# Patient Record
Sex: MALE | Race: WHITE | NOT HISPANIC OR LATINO | Employment: UNEMPLOYED | URBAN - METROPOLITAN AREA
[De-identification: names, ages, dates, MRNs, and addresses within clinical notes are randomized per-mention and may not be internally consistent; named-entity substitution may affect disease eponyms.]

---

## 2018-12-05 ENCOUNTER — DOCUMENTATION (OUTPATIENT)
Dept: OTHER | Facility: HOSPITAL | Age: 63
End: 2018-12-05

## 2018-12-05 ENCOUNTER — TELEPHONE (OUTPATIENT)
Dept: GENETICS | Facility: HOSPITAL | Age: 63
End: 2018-12-05

## 2018-12-06 ENCOUNTER — TELEPHONE (OUTPATIENT)
Dept: GENETICS | Facility: HOSPITAL | Age: 63
End: 2018-12-06

## 2018-12-12 ENCOUNTER — TRANSCRIBE ORDERS (OUTPATIENT)
Dept: ADMINISTRATIVE | Facility: HOSPITAL | Age: 63
End: 2018-12-12

## 2018-12-12 DIAGNOSIS — C43.62 MALIGNANT MELANOMA OF LEFT UPPER EXTREMITY INCLUDING SHOULDER (HCC): Primary | ICD-10-CM

## 2018-12-17 ENCOUNTER — HOSPITAL ENCOUNTER (OUTPATIENT)
Dept: NUCLEAR MEDICINE | Facility: HOSPITAL | Age: 63
Discharge: HOME | End: 2018-12-17
Attending: SURGERY

## 2018-12-17 ENCOUNTER — ANESTHESIA (OUTPATIENT)
Dept: PERIOP | Facility: HOSPITAL | Age: 63
End: 2018-12-17

## 2018-12-17 ENCOUNTER — ANESTHESIA EVENT (OUTPATIENT)
Dept: PERIOP | Facility: HOSPITAL | Age: 63
End: 2018-12-17

## 2018-12-17 ENCOUNTER — HOSPITAL ENCOUNTER (OUTPATIENT)
Facility: HOSPITAL | Age: 63
Setting detail: HOSPITAL OUTPATIENT SURGERY
Discharge: HOME | End: 2018-12-17
Attending: SURGERY | Admitting: SURGERY

## 2018-12-17 VITALS
DIASTOLIC BLOOD PRESSURE: 91 MMHG | OXYGEN SATURATION: 96 % | RESPIRATION RATE: 16 BRPM | WEIGHT: 243 LBS | SYSTOLIC BLOOD PRESSURE: 150 MMHG | TEMPERATURE: 98.4 F | HEIGHT: 74 IN | BODY MASS INDEX: 31.18 KG/M2 | HEART RATE: 72 BPM

## 2018-12-17 DIAGNOSIS — C43.62 MALIGNANT MELANOMA OF LEFT UPPER EXTREMITY INCLUDING SHOULDER (HCC): ICD-10-CM

## 2018-12-17 DIAGNOSIS — C43.62 MALIGNANT MELANOMA OF LEFT UPPER ARM (HCC): ICD-10-CM

## 2018-12-17 LAB
ANION GAP SERPL CALCULATED.3IONS-SCNC: 6 MMOL/L (ref 3–11)
BUN BLD-MCNC: 18 MG/DL (ref 9–23)
BUN/CREAT SERPL: 20.9 (ref 7–25)
CALCIUM SPEC-SCNC: 9.4 MG/DL (ref 8.7–10.4)
CHLORIDE SERPL-SCNC: 108 MMOL/L (ref 99–109)
CO2 SERPL-SCNC: 27 MMOL/L (ref 20–31)
CREAT BLD-MCNC: 0.86 MG/DL (ref 0.6–1.3)
DEPRECATED RDW RBC AUTO: 43.8 FL (ref 37–54)
ERYTHROCYTE [DISTWIDTH] IN BLOOD BY AUTOMATED COUNT: 13 % (ref 11.3–14.5)
GFR SERPL CREATININE-BSD FRML MDRD: 90 ML/MIN/1.73
GLUCOSE BLD-MCNC: 97 MG/DL (ref 70–100)
HCT VFR BLD AUTO: 44.6 % (ref 38.9–50.9)
HGB BLD-MCNC: 15.1 G/DL (ref 13.1–17.5)
MCH RBC QN AUTO: 31.5 PG (ref 27–31)
MCHC RBC AUTO-ENTMCNC: 33.9 G/DL (ref 32–36)
MCV RBC AUTO: 92.9 FL (ref 80–99)
PLATELET # BLD AUTO: 217 10*3/MM3 (ref 150–450)
PMV BLD AUTO: 9.9 FL (ref 6–12)
POTASSIUM BLD-SCNC: 3.8 MMOL/L (ref 3.5–5.5)
RBC # BLD AUTO: 4.8 10*6/MM3 (ref 4.2–5.76)
SODIUM BLD-SCNC: 141 MMOL/L (ref 132–146)
WBC NRBC COR # BLD: 6.5 10*3/MM3 (ref 3.5–10.8)

## 2018-12-17 PROCEDURE — 25010000003 CEFAZOLIN IN DEXTROSE 2-4 GM/100ML-% SOLUTION: Performed by: SURGERY

## 2018-12-17 PROCEDURE — 93005 ELECTROCARDIOGRAM TRACING: CPT | Performed by: SURGERY

## 2018-12-17 PROCEDURE — 88342 IMHCHEM/IMCYTCHM 1ST ANTB: CPT | Performed by: SURGERY

## 2018-12-17 PROCEDURE — 93010 ELECTROCARDIOGRAM REPORT: CPT | Performed by: INTERNAL MEDICINE

## 2018-12-17 PROCEDURE — 88341 IMHCHEM/IMCYTCHM EA ADD ANTB: CPT | Performed by: SURGERY

## 2018-12-17 PROCEDURE — A9541 TC99M SULFUR COLLOID: HCPCS | Performed by: SURGERY

## 2018-12-17 PROCEDURE — 25010000002 FENTANYL CITRATE (PF) 100 MCG/2ML SOLUTION: Performed by: NURSE ANESTHETIST, CERTIFIED REGISTERED

## 2018-12-17 PROCEDURE — 88305 TISSUE EXAM BY PATHOLOGIST: CPT | Performed by: SURGERY

## 2018-12-17 PROCEDURE — 25010000002 ONDANSETRON PER 1 MG: Performed by: NURSE ANESTHETIST, CERTIFIED REGISTERED

## 2018-12-17 PROCEDURE — 80048 BASIC METABOLIC PNL TOTAL CA: CPT | Performed by: SURGERY

## 2018-12-17 PROCEDURE — 88307 TISSUE EXAM BY PATHOLOGIST: CPT | Performed by: SURGERY

## 2018-12-17 PROCEDURE — 85027 COMPLETE CBC AUTOMATED: CPT | Performed by: SURGERY

## 2018-12-17 PROCEDURE — 25010000002 PROPOFOL 10 MG/ML EMULSION: Performed by: NURSE ANESTHETIST, CERTIFIED REGISTERED

## 2018-12-17 PROCEDURE — 0 TECHNETIUM FILTERED SULFUR COLLOID: Performed by: SURGERY

## 2018-12-17 PROCEDURE — 25010000002 MIDAZOLAM PER 1 MG: Performed by: ANESTHESIOLOGY

## 2018-12-17 PROCEDURE — 78195 LYMPH SYSTEM IMAGING: CPT

## 2018-12-17 PROCEDURE — 25010000002 DEXAMETHASONE PER 1 MG: Performed by: NURSE ANESTHETIST, CERTIFIED REGISTERED

## 2018-12-17 RX ORDER — SODIUM CHLORIDE, SODIUM LACTATE, POTASSIUM CHLORIDE, CALCIUM CHLORIDE 600; 310; 30; 20 MG/100ML; MG/100ML; MG/100ML; MG/100ML
9 INJECTION, SOLUTION INTRAVENOUS CONTINUOUS PRN
Status: DISCONTINUED | OUTPATIENT
Start: 2018-12-17 | End: 2018-12-17 | Stop reason: HOSPADM

## 2018-12-17 RX ORDER — MEPERIDINE HYDROCHLORIDE 25 MG/ML
12.5 INJECTION INTRAMUSCULAR; INTRAVENOUS; SUBCUTANEOUS
Status: DISCONTINUED | OUTPATIENT
Start: 2018-12-17 | End: 2018-12-17 | Stop reason: HOSPADM

## 2018-12-17 RX ORDER — ONDANSETRON 2 MG/ML
4 INJECTION INTRAMUSCULAR; INTRAVENOUS ONCE AS NEEDED
Status: DISCONTINUED | OUTPATIENT
Start: 2018-12-17 | End: 2018-12-17 | Stop reason: HOSPADM

## 2018-12-17 RX ORDER — ATENOLOL AND CHLORTHALIDONE TABLET 50; 25 MG/1; MG/1
1 TABLET ORAL DAILY
COMMUNITY

## 2018-12-17 RX ORDER — FENTANYL CITRATE 50 UG/ML
INJECTION, SOLUTION INTRAMUSCULAR; INTRAVENOUS AS NEEDED
Status: DISCONTINUED | OUTPATIENT
Start: 2018-12-17 | End: 2018-12-17 | Stop reason: SURG

## 2018-12-17 RX ORDER — MULTIVIT AND MINERALS-FERROUS GLUCONATE 9 MG IRON/15 ML ORAL LIQUID 9 MG/15 ML
LIQUID (ML) ORAL DAILY
COMMUNITY

## 2018-12-17 RX ORDER — DOCUSATE SODIUM 100 MG/1
100 CAPSULE, LIQUID FILLED ORAL ONCE
Status: DISCONTINUED | OUTPATIENT
Start: 2018-12-17 | End: 2018-12-17 | Stop reason: HOSPADM

## 2018-12-17 RX ORDER — ATORVASTATIN CALCIUM 40 MG/1
40 TABLET, FILM COATED ORAL NIGHTLY
COMMUNITY

## 2018-12-17 RX ORDER — PROPOFOL 10 MG/ML
VIAL (ML) INTRAVENOUS AS NEEDED
Status: DISCONTINUED | OUTPATIENT
Start: 2018-12-17 | End: 2018-12-17 | Stop reason: SURG

## 2018-12-17 RX ORDER — TAMSULOSIN HYDROCHLORIDE 0.4 MG/1
1 CAPSULE ORAL NIGHTLY
COMMUNITY

## 2018-12-17 RX ORDER — HYDROCODONE BITARTRATE AND ACETAMINOPHEN 5; 325 MG/1; MG/1
1 TABLET ORAL ONCE AS NEEDED
Status: DISCONTINUED | OUTPATIENT
Start: 2018-12-17 | End: 2018-12-17 | Stop reason: HOSPADM

## 2018-12-17 RX ORDER — ONDANSETRON 2 MG/ML
INJECTION INTRAMUSCULAR; INTRAVENOUS AS NEEDED
Status: DISCONTINUED | OUTPATIENT
Start: 2018-12-17 | End: 2018-12-17 | Stop reason: SURG

## 2018-12-17 RX ORDER — MELOXICAM 7.5 MG/1
15 TABLET ORAL ONCE
Status: COMPLETED | OUTPATIENT
Start: 2018-12-17 | End: 2018-12-17

## 2018-12-17 RX ORDER — FAMOTIDINE 20 MG/1
20 TABLET, FILM COATED ORAL
Status: DISCONTINUED | OUTPATIENT
Start: 2018-12-17 | End: 2018-12-17 | Stop reason: HOSPADM

## 2018-12-17 RX ORDER — SERTRALINE HYDROCHLORIDE 100 MG/1
100 TABLET, FILM COATED ORAL DAILY
COMMUNITY

## 2018-12-17 RX ORDER — FENTANYL CITRATE 50 UG/ML
50 INJECTION, SOLUTION INTRAMUSCULAR; INTRAVENOUS
Status: DISCONTINUED | OUTPATIENT
Start: 2018-12-17 | End: 2018-12-17 | Stop reason: HOSPADM

## 2018-12-17 RX ORDER — SODIUM CHLORIDE 9 MG/ML
INJECTION, SOLUTION INTRAVENOUS AS NEEDED
Status: DISCONTINUED | OUTPATIENT
Start: 2018-12-17 | End: 2018-12-17 | Stop reason: HOSPADM

## 2018-12-17 RX ORDER — MIDAZOLAM HYDROCHLORIDE 1 MG/ML
2 INJECTION INTRAMUSCULAR; INTRAVENOUS ONCE
Status: COMPLETED | OUTPATIENT
Start: 2018-12-17 | End: 2018-12-17

## 2018-12-17 RX ORDER — POTASSIUM CHLORIDE 750 MG/1
20 TABLET, FILM COATED, EXTENDED RELEASE ORAL DAILY
COMMUNITY

## 2018-12-17 RX ORDER — LIDOCAINE HYDROCHLORIDE 10 MG/ML
0.5 INJECTION, SOLUTION EPIDURAL; INFILTRATION; INTRACAUDAL; PERINEURAL ONCE AS NEEDED
Status: COMPLETED | OUTPATIENT
Start: 2018-12-17 | End: 2018-12-17

## 2018-12-17 RX ORDER — LIDOCAINE HYDROCHLORIDE 10 MG/ML
INJECTION, SOLUTION EPIDURAL; INFILTRATION; INTRACAUDAL; PERINEURAL AS NEEDED
Status: DISCONTINUED | OUTPATIENT
Start: 2018-12-17 | End: 2018-12-17 | Stop reason: SURG

## 2018-12-17 RX ORDER — ONDANSETRON 4 MG/1
4 TABLET, FILM COATED ORAL ONCE AS NEEDED
Status: DISCONTINUED | OUTPATIENT
Start: 2018-12-17 | End: 2018-12-17 | Stop reason: HOSPADM

## 2018-12-17 RX ORDER — CEFAZOLIN SODIUM 2 G/100ML
2 INJECTION, SOLUTION INTRAVENOUS ONCE
Status: COMPLETED | OUTPATIENT
Start: 2018-12-17 | End: 2018-12-17

## 2018-12-17 RX ORDER — PREGABALIN 75 MG/1
75 CAPSULE ORAL ONCE
Status: COMPLETED | OUTPATIENT
Start: 2018-12-17 | End: 2018-12-17

## 2018-12-17 RX ORDER — DOCUSATE SODIUM 250 MG
250 CAPSULE ORAL 2 TIMES DAILY
Qty: 14 CAPSULE | Refills: 0 | Status: SHIPPED | OUTPATIENT
Start: 2018-12-17 | End: 2022-05-16 | Stop reason: ALTCHOICE

## 2018-12-17 RX ORDER — PROMETHAZINE HYDROCHLORIDE 25 MG/ML
12.5 INJECTION, SOLUTION INTRAMUSCULAR; INTRAVENOUS ONCE AS NEEDED
Status: DISCONTINUED | OUTPATIENT
Start: 2018-12-17 | End: 2018-12-17 | Stop reason: HOSPADM

## 2018-12-17 RX ORDER — DEXAMETHASONE SODIUM PHOSPHATE 4 MG/ML
INJECTION, SOLUTION INTRA-ARTICULAR; INTRALESIONAL; INTRAMUSCULAR; INTRAVENOUS; SOFT TISSUE AS NEEDED
Status: DISCONTINUED | OUTPATIENT
Start: 2018-12-17 | End: 2018-12-17 | Stop reason: SURG

## 2018-12-17 RX ORDER — UBIDECARENONE 100 MG
100 CAPSULE ORAL DAILY
COMMUNITY

## 2018-12-17 RX ORDER — SODIUM CHLORIDE 0.9 % (FLUSH) 0.9 %
3 SYRINGE (ML) INJECTION EVERY 12 HOURS SCHEDULED
Status: DISCONTINUED | OUTPATIENT
Start: 2018-12-17 | End: 2018-12-17 | Stop reason: HOSPADM

## 2018-12-17 RX ORDER — ASPIRIN 81 MG/1
81 TABLET, CHEWABLE ORAL DAILY
COMMUNITY

## 2018-12-17 RX ORDER — HYDROMORPHONE HYDROCHLORIDE 1 MG/ML
0.5 INJECTION, SOLUTION INTRAMUSCULAR; INTRAVENOUS; SUBCUTANEOUS
Status: DISCONTINUED | OUTPATIENT
Start: 2018-12-17 | End: 2018-12-17 | Stop reason: HOSPADM

## 2018-12-17 RX ORDER — BUPIVACAINE HYDROCHLORIDE AND EPINEPHRINE 5; 5 MG/ML; UG/ML
INJECTION, SOLUTION PERINEURAL AS NEEDED
Status: DISCONTINUED | OUTPATIENT
Start: 2018-12-17 | End: 2018-12-17 | Stop reason: HOSPADM

## 2018-12-17 RX ORDER — SCOLOPAMINE TRANSDERMAL SYSTEM 1 MG/1
1 PATCH, EXTENDED RELEASE TRANSDERMAL ONCE
Status: DISCONTINUED | OUTPATIENT
Start: 2018-12-17 | End: 2018-12-17 | Stop reason: HOSPADM

## 2018-12-17 RX ORDER — IBUPROFEN 600 MG/1
600 TABLET ORAL EVERY 6 HOURS PRN
Status: DISCONTINUED | OUTPATIENT
Start: 2018-12-17 | End: 2018-12-17 | Stop reason: HOSPADM

## 2018-12-17 RX ORDER — ACETAMINOPHEN 500 MG
1000 TABLET ORAL ONCE
Status: COMPLETED | OUTPATIENT
Start: 2018-12-17 | End: 2018-12-17

## 2018-12-17 RX ORDER — SENNA PLUS 8.6 MG/1
1 TABLET ORAL ONCE
Status: DISCONTINUED | OUTPATIENT
Start: 2018-12-17 | End: 2018-12-17 | Stop reason: HOSPADM

## 2018-12-17 RX ORDER — HYDROCODONE BITARTRATE AND ACETAMINOPHEN 5; 325 MG/1; MG/1
1-2 TABLET ORAL EVERY 4 HOURS PRN
Qty: 20 TABLET | Refills: 0 | Status: SHIPPED | OUTPATIENT
Start: 2018-12-17 | End: 2022-05-16 | Stop reason: ALTCHOICE

## 2018-12-17 RX ORDER — SODIUM CHLORIDE 0.9 % (FLUSH) 0.9 %
1-10 SYRINGE (ML) INJECTION AS NEEDED
Status: DISCONTINUED | OUTPATIENT
Start: 2018-12-17 | End: 2018-12-17 | Stop reason: HOSPADM

## 2018-12-17 RX ADMIN — LIDOCAINE HYDROCHLORIDE 0.5 ML: 10 INJECTION, SOLUTION EPIDURAL; INFILTRATION; INTRACAUDAL; PERINEURAL at 12:33

## 2018-12-17 RX ADMIN — ACETAMINOPHEN 1000 MG: 500 TABLET, FILM COATED ORAL at 12:30

## 2018-12-17 RX ADMIN — SCOPALAMINE 1 PATCH: 1 PATCH, EXTENDED RELEASE TRANSDERMAL at 12:33

## 2018-12-17 RX ADMIN — DEXAMETHASONE SODIUM PHOSPHATE 8 MG: 4 INJECTION, SOLUTION INTRAMUSCULAR; INTRAVENOUS at 15:37

## 2018-12-17 RX ADMIN — FENTANYL CITRATE 50 MCG: 50 INJECTION, SOLUTION INTRAMUSCULAR; INTRAVENOUS at 16:00

## 2018-12-17 RX ADMIN — TECHNETIUM TC 99M SULFUR COLLOID 1 DOSE: KIT at 12:50

## 2018-12-17 RX ADMIN — FAMOTIDINE 20 MG: 20 TABLET, FILM COATED ORAL at 12:37

## 2018-12-17 RX ADMIN — FENTANYL CITRATE 50 MCG: 50 INJECTION, SOLUTION INTRAMUSCULAR; INTRAVENOUS at 16:37

## 2018-12-17 RX ADMIN — MIDAZOLAM HYDROCHLORIDE 2 MG: 1 INJECTION, SOLUTION INTRAMUSCULAR; INTRAVENOUS at 15:18

## 2018-12-17 RX ADMIN — FENTANYL CITRATE 50 MCG: 50 INJECTION, SOLUTION INTRAMUSCULAR; INTRAVENOUS at 15:38

## 2018-12-17 RX ADMIN — PREGABALIN 75 MG: 75 CAPSULE ORAL at 12:37

## 2018-12-17 RX ADMIN — PROPOFOL 150 MG: 10 INJECTION, EMULSION INTRAVENOUS at 15:32

## 2018-12-17 RX ADMIN — FENTANYL CITRATE 50 MCG: 50 INJECTION, SOLUTION INTRAMUSCULAR; INTRAVENOUS at 15:32

## 2018-12-17 RX ADMIN — ONDANSETRON 4 MG: 2 INJECTION INTRAMUSCULAR; INTRAVENOUS at 15:56

## 2018-12-17 RX ADMIN — CEFAZOLIN SODIUM 2 G: 2 INJECTION, SOLUTION INTRAVENOUS at 15:31

## 2018-12-17 RX ADMIN — MELOXICAM 15 MG: 7.5 TABLET ORAL at 12:37

## 2018-12-17 RX ADMIN — LIDOCAINE HYDROCHLORIDE 50 MG: 10 INJECTION, SOLUTION EPIDURAL; INFILTRATION; INTRACAUDAL; PERINEURAL at 15:32

## 2018-12-17 RX ADMIN — SODIUM CHLORIDE, POTASSIUM CHLORIDE, SODIUM LACTATE AND CALCIUM CHLORIDE 9 ML/HR: 600; 310; 30; 20 INJECTION, SOLUTION INTRAVENOUS at 12:33

## 2018-12-27 LAB
CYTO UR: NORMAL
LAB AP CASE REPORT: NORMAL
LAB AP CLINICAL INFORMATION: NORMAL
LAB AP DIAGNOSIS COMMENT: NORMAL
LAB AP SPECIAL STAINS: NORMAL
PATH REPORT.FINAL DX SPEC: NORMAL
PATH REPORT.GROSS SPEC: NORMAL

## 2019-01-02 ENCOUNTER — DOCUMENTATION (OUTPATIENT)
Dept: OTHER | Facility: HOSPITAL | Age: 64
End: 2019-01-02

## 2019-01-02 PROBLEM — C43.62 MALIGNANT MELANOMA OF LEFT UPPER EXTREMITY INCLUDING SHOULDER (HCC): Status: ACTIVE | Noted: 2019-01-02

## 2019-01-03 ENCOUNTER — DOCUMENTATION (OUTPATIENT)
Dept: OTHER | Facility: HOSPITAL | Age: 64
End: 2019-01-03

## 2021-06-08 ENCOUNTER — TRANSCRIBE ORDERS (OUTPATIENT)
Dept: ADMINISTRATIVE | Facility: HOSPITAL | Age: 66
End: 2021-06-08

## 2021-06-08 DIAGNOSIS — N43.0 ENCYSTED HYDROCELE: Primary | ICD-10-CM

## 2021-06-11 ENCOUNTER — HOSPITAL ENCOUNTER (OUTPATIENT)
Dept: ULTRASOUND IMAGING | Facility: HOSPITAL | Age: 66
Discharge: HOME | End: 2021-06-11
Admitting: UROLOGY

## 2021-06-11 DIAGNOSIS — N43.0 ENCYSTED HYDROCELE: ICD-10-CM

## 2021-06-11 PROCEDURE — 76870 US EXAM SCROTUM: CPT | Performed by: RADIOLOGY

## 2021-06-11 PROCEDURE — 76870 US EXAM SCROTUM: CPT

## 2022-01-26 ENCOUNTER — TRANSCRIBE ORDERS (OUTPATIENT)
Dept: LAB | Facility: HOSPITAL | Age: 67
End: 2022-01-26

## 2022-01-26 DIAGNOSIS — Z01.818 OTHER SPECIFIED PRE-OPERATIVE EXAMINATION: Primary | ICD-10-CM

## 2022-01-28 ENCOUNTER — LAB (OUTPATIENT)
Dept: LAB | Facility: HOSPITAL | Age: 67
End: 2022-01-28

## 2022-01-28 DIAGNOSIS — Z01.818 OTHER SPECIFIED PRE-OPERATIVE EXAMINATION: ICD-10-CM

## 2022-01-28 PROCEDURE — U0005 INFEC AGEN DETEC AMPLI PROBE: HCPCS | Performed by: OPHTHALMOLOGY

## 2022-01-28 PROCEDURE — C9803 HOPD COVID-19 SPEC COLLECT: HCPCS

## 2022-01-28 PROCEDURE — U0004 COV-19 TEST NON-CDC HGH THRU: HCPCS | Performed by: OPHTHALMOLOGY

## 2022-01-29 LAB — SARS-COV-2 RNA PNL SPEC NAA+PROBE: NOT DETECTED

## 2022-02-16 ENCOUNTER — TRANSCRIBE ORDERS (OUTPATIENT)
Dept: ADMINISTRATIVE | Facility: HOSPITAL | Age: 67
End: 2022-02-16

## 2022-02-16 DIAGNOSIS — Z01.818 PRE-OPERATIVE CLEARANCE: Primary | ICD-10-CM

## 2022-02-18 ENCOUNTER — LAB (OUTPATIENT)
Dept: LAB | Facility: HOSPITAL | Age: 67
End: 2022-02-18

## 2022-02-18 DIAGNOSIS — Z01.818 PRE-OPERATIVE CLEARANCE: ICD-10-CM

## 2022-02-18 PROCEDURE — U0005 INFEC AGEN DETEC AMPLI PROBE: HCPCS | Performed by: OPHTHALMOLOGY

## 2022-02-18 PROCEDURE — U0004 COV-19 TEST NON-CDC HGH THRU: HCPCS | Performed by: OPHTHALMOLOGY

## 2022-02-19 LAB — SARS-COV-2 RNA PNL SPEC NAA+PROBE: NOT DETECTED

## 2022-03-21 DIAGNOSIS — M25.561 RIGHT KNEE PAIN, UNSPECIFIED CHRONICITY: Primary | ICD-10-CM

## 2022-03-22 ENCOUNTER — OFFICE VISIT (OUTPATIENT)
Dept: ORTHOPEDIC SURGERY | Facility: CLINIC | Age: 67
End: 2022-03-22

## 2022-03-22 VITALS
WEIGHT: 242.51 LBS | BODY MASS INDEX: 31.12 KG/M2 | DIASTOLIC BLOOD PRESSURE: 83 MMHG | SYSTOLIC BLOOD PRESSURE: 146 MMHG | HEIGHT: 74 IN | HEART RATE: 59 BPM

## 2022-03-22 DIAGNOSIS — S83.004A PATELLAR DISLOCATION, RIGHT, INITIAL ENCOUNTER: Primary | ICD-10-CM

## 2022-03-22 PROCEDURE — 99203 OFFICE O/P NEW LOW 30 MIN: CPT | Performed by: PHYSICIAN ASSISTANT

## 2022-03-22 PROCEDURE — 20610 DRAIN/INJ JOINT/BURSA W/O US: CPT | Performed by: PHYSICIAN ASSISTANT

## 2022-03-22 RX ORDER — LIDOCAINE HYDROCHLORIDE 10 MG/ML
5 INJECTION, SOLUTION EPIDURAL; INFILTRATION; INTRACAUDAL; PERINEURAL
Status: COMPLETED | OUTPATIENT
Start: 2022-03-22 | End: 2022-03-22

## 2022-03-22 RX ORDER — METHYLPREDNISOLONE ACETATE 80 MG/ML
80 INJECTION, SUSPENSION INTRA-ARTICULAR; INTRALESIONAL; INTRAMUSCULAR; SOFT TISSUE
Status: COMPLETED | OUTPATIENT
Start: 2022-03-22 | End: 2022-03-22

## 2022-03-22 RX ADMIN — LIDOCAINE HYDROCHLORIDE 5 ML: 10 INJECTION, SOLUTION EPIDURAL; INFILTRATION; INTRACAUDAL; PERINEURAL at 15:02

## 2022-03-22 RX ADMIN — METHYLPREDNISOLONE ACETATE 80 MG: 80 INJECTION, SUSPENSION INTRA-ARTICULAR; INTRALESIONAL; INTRAMUSCULAR; SOFT TISSUE at 15:02

## 2022-04-05 ENCOUNTER — OFFICE VISIT (OUTPATIENT)
Dept: ORTHOPEDIC SURGERY | Facility: CLINIC | Age: 67
End: 2022-04-05

## 2022-04-05 VITALS — HEIGHT: 74 IN | BODY MASS INDEX: 31.12 KG/M2 | WEIGHT: 242.51 LBS

## 2022-04-05 DIAGNOSIS — S83.004D DISLOCATION OF RIGHT PATELLA, SUBSEQUENT ENCOUNTER: ICD-10-CM

## 2022-04-05 DIAGNOSIS — M25.561 RIGHT KNEE PAIN, UNSPECIFIED CHRONICITY: Primary | ICD-10-CM

## 2022-04-05 PROCEDURE — 99213 OFFICE O/P EST LOW 20 MIN: CPT | Performed by: PHYSICIAN ASSISTANT

## 2022-04-06 ENCOUNTER — PATIENT ROUNDING (BHMG ONLY) (OUTPATIENT)
Dept: ORTHOPEDIC SURGERY | Facility: CLINIC | Age: 67
End: 2022-04-06

## 2022-05-16 ENCOUNTER — OFFICE VISIT (OUTPATIENT)
Dept: ORTHOPEDIC SURGERY | Facility: CLINIC | Age: 67
End: 2022-05-16

## 2022-05-16 VITALS — HEIGHT: 74 IN | BODY MASS INDEX: 31.06 KG/M2 | WEIGHT: 242 LBS

## 2022-05-16 DIAGNOSIS — S83.004D DISLOCATION OF RIGHT PATELLA, SUBSEQUENT ENCOUNTER: ICD-10-CM

## 2022-05-16 DIAGNOSIS — M25.561 RIGHT KNEE PAIN, UNSPECIFIED CHRONICITY: Primary | ICD-10-CM

## 2022-05-16 PROCEDURE — 99213 OFFICE O/P EST LOW 20 MIN: CPT | Performed by: PHYSICIAN ASSISTANT

## 2022-06-13 ENCOUNTER — OFFICE VISIT (OUTPATIENT)
Dept: ORTHOPEDIC SURGERY | Facility: CLINIC | Age: 67
End: 2022-06-13

## 2022-06-13 VITALS — BODY MASS INDEX: 31.06 KG/M2 | HEIGHT: 74 IN | WEIGHT: 242 LBS

## 2022-06-13 DIAGNOSIS — S83.004A PATELLAR DISLOCATION, RIGHT, INITIAL ENCOUNTER: Primary | ICD-10-CM

## 2022-06-13 DIAGNOSIS — M25.561 RIGHT KNEE PAIN, UNSPECIFIED CHRONICITY: ICD-10-CM

## 2022-06-13 DIAGNOSIS — S83.004D DISLOCATION OF RIGHT PATELLA, SUBSEQUENT ENCOUNTER: ICD-10-CM

## 2022-06-13 PROCEDURE — 99213 OFFICE O/P EST LOW 20 MIN: CPT | Performed by: PHYSICIAN ASSISTANT

## 2022-06-21 DIAGNOSIS — M25.561 RIGHT KNEE PAIN, UNSPECIFIED CHRONICITY: Primary | ICD-10-CM

## 2022-06-22 ENCOUNTER — OFFICE VISIT (OUTPATIENT)
Dept: ORTHOPEDIC SURGERY | Facility: CLINIC | Age: 67
End: 2022-06-22

## 2022-06-22 VITALS — BODY MASS INDEX: 31.06 KG/M2 | HEIGHT: 74 IN | WEIGHT: 242 LBS

## 2022-06-22 DIAGNOSIS — M25.561 RIGHT KNEE PAIN, UNSPECIFIED CHRONICITY: Primary | ICD-10-CM

## 2022-06-22 DIAGNOSIS — M17.11 PRIMARY OSTEOARTHRITIS OF RIGHT KNEE: ICD-10-CM

## 2022-06-22 PROCEDURE — 99213 OFFICE O/P EST LOW 20 MIN: CPT | Performed by: PHYSICIAN ASSISTANT

## 2022-07-13 ENCOUNTER — OFFICE VISIT (OUTPATIENT)
Dept: ORTHOPEDIC SURGERY | Facility: CLINIC | Age: 67
End: 2022-07-13

## 2022-07-13 VITALS — WEIGHT: 242 LBS | BODY MASS INDEX: 31.06 KG/M2 | HEIGHT: 74 IN

## 2022-07-13 DIAGNOSIS — M17.11 PRIMARY OSTEOARTHRITIS OF RIGHT KNEE: ICD-10-CM

## 2022-07-13 DIAGNOSIS — M25.561 RIGHT KNEE PAIN, UNSPECIFIED CHRONICITY: Primary | ICD-10-CM

## 2022-07-13 PROCEDURE — 99213 OFFICE O/P EST LOW 20 MIN: CPT | Performed by: PHYSICIAN ASSISTANT

## 2022-07-13 RX ORDER — FINASTERIDE 5 MG/1
5 TABLET, FILM COATED ORAL DAILY
COMMUNITY
Start: 2022-06-23

## 2022-07-29 ENCOUNTER — HOSPITAL ENCOUNTER (OUTPATIENT)
Dept: MRI IMAGING | Facility: HOSPITAL | Age: 67
Discharge: HOME | End: 2022-07-29

## 2022-07-29 DIAGNOSIS — M17.11 PRIMARY OSTEOARTHRITIS OF RIGHT KNEE: ICD-10-CM

## 2022-07-29 DIAGNOSIS — M25.561 RIGHT KNEE PAIN, UNSPECIFIED CHRONICITY: ICD-10-CM

## 2022-07-29 PROCEDURE — 73721 MRI JNT OF LWR EXTRE W/O DYE: CPT

## 2022-07-29 PROCEDURE — 73721 MRI JNT OF LWR EXTRE W/O DYE: CPT | Performed by: RADIOLOGY

## 2022-08-09 ENCOUNTER — HOSPITAL ENCOUNTER (EMERGENCY)
Dept: HOSPITAL 79 - ER1 | Age: 67
Discharge: HOME | End: 2022-08-09
Payer: COMMERCIAL

## 2022-08-09 DIAGNOSIS — J18.1: Primary | ICD-10-CM

## 2022-08-17 ENCOUNTER — OFFICE VISIT (OUTPATIENT)
Dept: ORTHOPEDIC SURGERY | Facility: CLINIC | Age: 67
End: 2022-08-17

## 2022-08-17 VITALS — WEIGHT: 242 LBS | HEIGHT: 74 IN | BODY MASS INDEX: 31.06 KG/M2

## 2022-08-17 DIAGNOSIS — M25.561 RIGHT KNEE PAIN, UNSPECIFIED CHRONICITY: Primary | ICD-10-CM

## 2022-08-17 DIAGNOSIS — M17.11 PRIMARY OSTEOARTHRITIS OF RIGHT KNEE: ICD-10-CM

## 2022-08-17 PROCEDURE — 99213 OFFICE O/P EST LOW 20 MIN: CPT | Performed by: PHYSICIAN ASSISTANT

## 2022-09-01 ENCOUNTER — OFFICE VISIT (OUTPATIENT)
Dept: ORTHOPEDIC SURGERY | Facility: CLINIC | Age: 67
End: 2022-09-01

## 2022-09-01 VITALS — HEIGHT: 74 IN | BODY MASS INDEX: 31.06 KG/M2 | WEIGHT: 242 LBS

## 2022-09-01 DIAGNOSIS — M17.11 PRIMARY OSTEOARTHRITIS OF RIGHT KNEE: ICD-10-CM

## 2022-09-01 DIAGNOSIS — S83.004D DISLOCATION OF RIGHT PATELLA, SUBSEQUENT ENCOUNTER: ICD-10-CM

## 2022-09-01 DIAGNOSIS — M25.561 RIGHT KNEE PAIN, UNSPECIFIED CHRONICITY: Primary | ICD-10-CM

## 2022-09-01 PROCEDURE — 99213 OFFICE O/P EST LOW 20 MIN: CPT | Performed by: PHYSICIAN ASSISTANT

## 2022-09-29 ENCOUNTER — OFFICE VISIT (OUTPATIENT)
Dept: ORTHOPEDIC SURGERY | Facility: CLINIC | Age: 67
End: 2022-09-29

## 2022-09-29 VITALS — HEIGHT: 74 IN | WEIGHT: 242 LBS | BODY MASS INDEX: 31.06 KG/M2

## 2022-09-29 DIAGNOSIS — S83.004D DISLOCATION OF RIGHT PATELLA, SUBSEQUENT ENCOUNTER: ICD-10-CM

## 2022-09-29 DIAGNOSIS — M25.561 RIGHT KNEE PAIN, UNSPECIFIED CHRONICITY: Primary | ICD-10-CM

## 2022-09-29 DIAGNOSIS — M17.11 PRIMARY OSTEOARTHRITIS OF RIGHT KNEE: ICD-10-CM

## 2022-09-29 PROCEDURE — 99213 OFFICE O/P EST LOW 20 MIN: CPT | Performed by: PHYSICIAN ASSISTANT

## 2022-10-31 ENCOUNTER — OFFICE VISIT (OUTPATIENT)
Dept: GASTROENTEROLOGY | Facility: CLINIC | Age: 67
End: 2022-10-31

## 2022-10-31 VITALS
OXYGEN SATURATION: 95 % | SYSTOLIC BLOOD PRESSURE: 127 MMHG | DIASTOLIC BLOOD PRESSURE: 77 MMHG | WEIGHT: 237 LBS | BODY MASS INDEX: 30.42 KG/M2 | HEIGHT: 74 IN | HEART RATE: 53 BPM

## 2022-10-31 DIAGNOSIS — Z90.49 HISTORY OF RESECTION OF LARGE BOWEL: Primary | ICD-10-CM

## 2022-10-31 DIAGNOSIS — Z12.11 ENCOUNTER FOR SCREENING FOR MALIGNANT NEOPLASM OF COLON: ICD-10-CM

## 2022-10-31 DIAGNOSIS — K59.04 CHRONIC IDIOPATHIC CONSTIPATION: ICD-10-CM

## 2022-10-31 PROCEDURE — 99214 OFFICE O/P EST MOD 30 MIN: CPT | Performed by: PHYSICIAN ASSISTANT

## 2022-10-31 RX ORDER — BISACODYL 5 MG/1
20 TABLET, DELAYED RELEASE ORAL ONCE
Qty: 4 TABLET | Refills: 0 | Status: SHIPPED | OUTPATIENT
Start: 2022-10-31 | End: 2022-10-31

## 2022-10-31 RX ORDER — POLYETHYLENE GLYCOL 3350 17 G/17G
510 POWDER, FOR SOLUTION ORAL TAKE AS DIRECTED
Qty: 510 G | Refills: 0 | Status: SHIPPED | OUTPATIENT
Start: 2022-10-31 | End: 2022-11-10 | Stop reason: HOSPADM

## 2022-11-01 PROBLEM — Z12.11 ENCOUNTER FOR SCREENING FOR MALIGNANT NEOPLASM OF COLON: Status: ACTIVE | Noted: 2022-11-01

## 2022-11-01 PROBLEM — Z90.49 HISTORY OF RESECTION OF LARGE BOWEL: Status: ACTIVE | Noted: 2022-11-01

## 2022-11-01 PROBLEM — K59.04 CHRONIC IDIOPATHIC CONSTIPATION: Status: ACTIVE | Noted: 2022-11-01

## 2022-11-03 ENCOUNTER — OFFICE VISIT (OUTPATIENT)
Dept: UROLOGY | Facility: CLINIC | Age: 67
End: 2022-11-03

## 2022-11-03 VITALS — HEIGHT: 74 IN | BODY MASS INDEX: 30.16 KG/M2 | WEIGHT: 235 LBS

## 2022-11-03 DIAGNOSIS — N43.3 HYDROCELE, BILATERAL: Primary | ICD-10-CM

## 2022-11-03 PROCEDURE — 99203 OFFICE O/P NEW LOW 30 MIN: CPT

## 2022-11-03 RX ORDER — GENTAMICIN SULFATE 80 MG/100ML
80 INJECTION, SOLUTION INTRAVENOUS ONCE
Status: CANCELLED | OUTPATIENT
Start: 2022-12-07 | End: 2022-11-03

## 2022-11-10 ENCOUNTER — ANESTHESIA EVENT (OUTPATIENT)
Dept: PERIOP | Facility: HOSPITAL | Age: 67
End: 2022-11-10

## 2022-11-10 ENCOUNTER — ANESTHESIA (OUTPATIENT)
Dept: PERIOP | Facility: HOSPITAL | Age: 67
End: 2022-11-10

## 2022-11-10 ENCOUNTER — HOSPITAL ENCOUNTER (OUTPATIENT)
Facility: HOSPITAL | Age: 67
Setting detail: HOSPITAL OUTPATIENT SURGERY
Discharge: HOME | End: 2022-11-10
Attending: INTERNAL MEDICINE | Admitting: INTERNAL MEDICINE

## 2022-11-10 VITALS
TEMPERATURE: 97.1 F | WEIGHT: 230 LBS | RESPIRATION RATE: 18 BRPM | HEIGHT: 74 IN | HEART RATE: 44 BPM | OXYGEN SATURATION: 94 % | SYSTOLIC BLOOD PRESSURE: 141 MMHG | DIASTOLIC BLOOD PRESSURE: 79 MMHG | BODY MASS INDEX: 29.52 KG/M2

## 2022-11-10 DIAGNOSIS — Z90.49 HISTORY OF RESECTION OF LARGE BOWEL: ICD-10-CM

## 2022-11-10 DIAGNOSIS — Z12.11 ENCOUNTER FOR SCREENING FOR MALIGNANT NEOPLASM OF COLON: ICD-10-CM

## 2022-11-10 DIAGNOSIS — K59.04 CHRONIC IDIOPATHIC CONSTIPATION: ICD-10-CM

## 2022-11-10 PROCEDURE — 88305 TISSUE EXAM BY PATHOLOGIST: CPT

## 2022-11-10 PROCEDURE — 45385 COLONOSCOPY W/LESION REMOVAL: CPT | Performed by: INTERNAL MEDICINE

## 2022-11-10 PROCEDURE — 25010000002 PROPOFOL 10 MG/ML EMULSION: Performed by: NURSE ANESTHETIST, CERTIFIED REGISTERED

## 2022-11-10 RX ORDER — LIDOCAINE HYDROCHLORIDE 20 MG/ML
INJECTION, SOLUTION EPIDURAL; INFILTRATION; INTRACAUDAL; PERINEURAL AS NEEDED
Status: DISCONTINUED | OUTPATIENT
Start: 2022-11-10 | End: 2022-11-10 | Stop reason: SURG

## 2022-11-10 RX ORDER — PLECANATIDE 3 MG/1
3 TABLET ORAL DAILY
Qty: 30 TABLET | Refills: 0 | Status: SHIPPED | OUTPATIENT
Start: 2022-11-10 | End: 2022-12-05

## 2022-11-10 RX ORDER — SODIUM CHLORIDE 0.9 % (FLUSH) 0.9 %
10 SYRINGE (ML) INJECTION EVERY 12 HOURS SCHEDULED
Status: DISCONTINUED | OUTPATIENT
Start: 2022-11-10 | End: 2022-11-10 | Stop reason: HOSPADM

## 2022-11-10 RX ORDER — MIDAZOLAM HYDROCHLORIDE 1 MG/ML
0.5 INJECTION INTRAMUSCULAR; INTRAVENOUS
Status: DISCONTINUED | OUTPATIENT
Start: 2022-11-10 | End: 2022-11-10 | Stop reason: HOSPADM

## 2022-11-10 RX ORDER — IPRATROPIUM BROMIDE AND ALBUTEROL SULFATE 2.5; .5 MG/3ML; MG/3ML
3 SOLUTION RESPIRATORY (INHALATION) ONCE AS NEEDED
Status: DISCONTINUED | OUTPATIENT
Start: 2022-11-10 | End: 2022-11-10 | Stop reason: HOSPADM

## 2022-11-10 RX ORDER — SODIUM CHLORIDE 0.9 % (FLUSH) 0.9 %
10 SYRINGE (ML) INJECTION AS NEEDED
Status: DISCONTINUED | OUTPATIENT
Start: 2022-11-10 | End: 2022-11-10 | Stop reason: HOSPADM

## 2022-11-10 RX ORDER — FENTANYL CITRATE 50 UG/ML
50 INJECTION, SOLUTION INTRAMUSCULAR; INTRAVENOUS
Status: DISCONTINUED | OUTPATIENT
Start: 2022-11-10 | End: 2022-11-10 | Stop reason: HOSPADM

## 2022-11-10 RX ORDER — MEPERIDINE HYDROCHLORIDE 25 MG/ML
12.5 INJECTION INTRAMUSCULAR; INTRAVENOUS; SUBCUTANEOUS
Status: DISCONTINUED | OUTPATIENT
Start: 2022-11-10 | End: 2022-11-10 | Stop reason: HOSPADM

## 2022-11-10 RX ORDER — OXYCODONE HYDROCHLORIDE AND ACETAMINOPHEN 5; 325 MG/1; MG/1
1 TABLET ORAL ONCE AS NEEDED
Status: DISCONTINUED | OUTPATIENT
Start: 2022-11-10 | End: 2022-11-10 | Stop reason: HOSPADM

## 2022-11-10 RX ORDER — SODIUM CHLORIDE, SODIUM LACTATE, POTASSIUM CHLORIDE, CALCIUM CHLORIDE 600; 310; 30; 20 MG/100ML; MG/100ML; MG/100ML; MG/100ML
125 INJECTION, SOLUTION INTRAVENOUS ONCE
Status: COMPLETED | OUTPATIENT
Start: 2022-11-10 | End: 2022-11-10

## 2022-11-10 RX ORDER — PROPOFOL 10 MG/ML
VIAL (ML) INTRAVENOUS CONTINUOUS PRN
Status: DISCONTINUED | OUTPATIENT
Start: 2022-11-10 | End: 2022-11-10 | Stop reason: SURG

## 2022-11-10 RX ORDER — ONDANSETRON 2 MG/ML
4 INJECTION INTRAMUSCULAR; INTRAVENOUS AS NEEDED
Status: DISCONTINUED | OUTPATIENT
Start: 2022-11-10 | End: 2022-11-10 | Stop reason: HOSPADM

## 2022-11-10 RX ORDER — SODIUM CHLORIDE, SODIUM LACTATE, POTASSIUM CHLORIDE, CALCIUM CHLORIDE 600; 310; 30; 20 MG/100ML; MG/100ML; MG/100ML; MG/100ML
100 INJECTION, SOLUTION INTRAVENOUS ONCE AS NEEDED
Status: DISCONTINUED | OUTPATIENT
Start: 2022-11-10 | End: 2022-11-10 | Stop reason: HOSPADM

## 2022-11-10 RX ADMIN — LIDOCAINE HYDROCHLORIDE 60 MG: 20 INJECTION, SOLUTION EPIDURAL; INFILTRATION; INTRACAUDAL; PERINEURAL at 08:47

## 2022-11-10 RX ADMIN — PROPOFOL 200 MCG/KG/MIN: 10 INJECTION, EMULSION INTRAVENOUS at 08:47

## 2022-11-10 RX ADMIN — SODIUM CHLORIDE, POTASSIUM CHLORIDE, SODIUM LACTATE AND CALCIUM CHLORIDE: 600; 310; 30; 20 INJECTION, SOLUTION INTRAVENOUS at 08:47

## 2022-11-11 LAB — REF LAB TEST METHOD: NORMAL

## 2022-12-05 ENCOUNTER — PRE-ADMISSION TESTING (OUTPATIENT)
Dept: PREADMISSION TESTING | Facility: HOSPITAL | Age: 67
End: 2022-12-05

## 2022-12-05 LAB
ANION GAP SERPL CALCULATED.3IONS-SCNC: 11.6 MMOL/L (ref 5–15)
BUN SERPL-MCNC: 16 MG/DL (ref 8–23)
BUN/CREAT SERPL: 18.2 (ref 7–25)
CALCIUM SPEC-SCNC: 10.1 MG/DL (ref 8.6–10.5)
CHLORIDE SERPL-SCNC: 100 MMOL/L (ref 98–107)
CO2 SERPL-SCNC: 28.4 MMOL/L (ref 22–29)
CREAT SERPL-MCNC: 0.88 MG/DL (ref 0.76–1.27)
DEPRECATED RDW RBC AUTO: 43.4 FL (ref 37–54)
EGFRCR SERPLBLD CKD-EPI 2021: 94.2 ML/MIN/1.73
ERYTHROCYTE [DISTWIDTH] IN BLOOD BY AUTOMATED COUNT: 12.6 % (ref 12.3–15.4)
GLUCOSE SERPL-MCNC: 107 MG/DL (ref 65–99)
HCT VFR BLD AUTO: 44.9 % (ref 37.5–51)
HGB BLD-MCNC: 15.3 G/DL (ref 13–17.7)
MCH RBC QN AUTO: 31.9 PG (ref 26.6–33)
MCHC RBC AUTO-ENTMCNC: 34.1 G/DL (ref 31.5–35.7)
MCV RBC AUTO: 93.5 FL (ref 79–97)
PLATELET # BLD AUTO: 194 10*3/MM3 (ref 140–450)
PMV BLD AUTO: 10.2 FL (ref 6–12)
POTASSIUM SERPL-SCNC: 3.7 MMOL/L (ref 3.5–5.2)
QT INTERVAL: 436 MS
QTC INTERVAL: 397 MS
RBC # BLD AUTO: 4.8 10*6/MM3 (ref 4.14–5.8)
SODIUM SERPL-SCNC: 140 MMOL/L (ref 136–145)
WBC NRBC COR # BLD: 6.79 10*3/MM3 (ref 3.4–10.8)

## 2022-12-05 PROCEDURE — 36415 COLL VENOUS BLD VENIPUNCTURE: CPT

## 2022-12-05 PROCEDURE — 93005 ELECTROCARDIOGRAM TRACING: CPT

## 2022-12-05 PROCEDURE — 85027 COMPLETE CBC AUTOMATED: CPT

## 2022-12-05 PROCEDURE — 80048 BASIC METABOLIC PNL TOTAL CA: CPT

## 2022-12-05 PROCEDURE — 93010 ELECTROCARDIOGRAM REPORT: CPT | Performed by: INTERNAL MEDICINE

## 2022-12-07 ENCOUNTER — ANESTHESIA (OUTPATIENT)
Dept: PERIOP | Facility: HOSPITAL | Age: 67
End: 2022-12-07

## 2022-12-07 ENCOUNTER — HOSPITAL ENCOUNTER (OUTPATIENT)
Facility: HOSPITAL | Age: 67
Setting detail: HOSPITAL OUTPATIENT SURGERY
Discharge: HOME | End: 2022-12-07
Attending: UROLOGY | Admitting: UROLOGY

## 2022-12-07 ENCOUNTER — ANESTHESIA EVENT (OUTPATIENT)
Dept: PERIOP | Facility: HOSPITAL | Age: 67
End: 2022-12-07

## 2022-12-07 VITALS
HEART RATE: 58 BPM | HEIGHT: 74 IN | SYSTOLIC BLOOD PRESSURE: 128 MMHG | TEMPERATURE: 97.4 F | WEIGHT: 240.6 LBS | RESPIRATION RATE: 18 BRPM | DIASTOLIC BLOOD PRESSURE: 73 MMHG | BODY MASS INDEX: 30.88 KG/M2 | OXYGEN SATURATION: 95 %

## 2022-12-07 DIAGNOSIS — N43.3 HYDROCELE, BILATERAL: ICD-10-CM

## 2022-12-07 PROCEDURE — 25010000002 PROPOFOL 10 MG/ML EMULSION: Performed by: NURSE ANESTHETIST, CERTIFIED REGISTERED

## 2022-12-07 PROCEDURE — S0260 H&P FOR SURGERY: HCPCS | Performed by: UROLOGY

## 2022-12-07 PROCEDURE — 25010000002 FENTANYL CITRATE (PF) 50 MCG/ML SOLUTION: Performed by: NURSE ANESTHETIST, CERTIFIED REGISTERED

## 2022-12-07 PROCEDURE — 25010000002 GENTAMICIN PER 80 MG

## 2022-12-07 PROCEDURE — 55041 REMOVAL OF HYDROCELES: CPT | Performed by: UROLOGY

## 2022-12-07 PROCEDURE — 25010000002 ONDANSETRON PER 1 MG: Performed by: NURSE ANESTHETIST, CERTIFIED REGISTERED

## 2022-12-07 RX ORDER — SODIUM CHLORIDE, SODIUM LACTATE, POTASSIUM CHLORIDE, CALCIUM CHLORIDE 600; 310; 30; 20 MG/100ML; MG/100ML; MG/100ML; MG/100ML
INJECTION, SOLUTION INTRAVENOUS CONTINUOUS PRN
Status: DISCONTINUED | OUTPATIENT
Start: 2022-12-07 | End: 2022-12-07 | Stop reason: SURG

## 2022-12-07 RX ORDER — FENTANYL CITRATE 50 UG/ML
INJECTION, SOLUTION INTRAMUSCULAR; INTRAVENOUS AS NEEDED
Status: DISCONTINUED | OUTPATIENT
Start: 2022-12-07 | End: 2022-12-07 | Stop reason: SURG

## 2022-12-07 RX ORDER — SODIUM CHLORIDE 0.9 % (FLUSH) 0.9 %
10 SYRINGE (ML) INJECTION AS NEEDED
Status: DISCONTINUED | OUTPATIENT
Start: 2022-12-07 | End: 2022-12-07 | Stop reason: HOSPADM

## 2022-12-07 RX ORDER — SODIUM CHLORIDE, SODIUM LACTATE, POTASSIUM CHLORIDE, CALCIUM CHLORIDE 600; 310; 30; 20 MG/100ML; MG/100ML; MG/100ML; MG/100ML
125 INJECTION, SOLUTION INTRAVENOUS ONCE
Status: COMPLETED | OUTPATIENT
Start: 2022-12-07 | End: 2022-12-07

## 2022-12-07 RX ORDER — MIDAZOLAM HYDROCHLORIDE 1 MG/ML
0.5 INJECTION INTRAMUSCULAR; INTRAVENOUS
Status: DISCONTINUED | OUTPATIENT
Start: 2022-12-07 | End: 2022-12-07 | Stop reason: HOSPADM

## 2022-12-07 RX ORDER — SODIUM CHLORIDE, SODIUM LACTATE, POTASSIUM CHLORIDE, CALCIUM CHLORIDE 600; 310; 30; 20 MG/100ML; MG/100ML; MG/100ML; MG/100ML
100 INJECTION, SOLUTION INTRAVENOUS ONCE AS NEEDED
Status: DISCONTINUED | OUTPATIENT
Start: 2022-12-07 | End: 2022-12-07 | Stop reason: HOSPADM

## 2022-12-07 RX ORDER — HYDROCODONE BITARTRATE AND ACETAMINOPHEN 10; 325 MG/1; MG/1
1 TABLET ORAL EVERY 4 HOURS PRN
Qty: 12 TABLET | Refills: 0 | Status: SHIPPED | OUTPATIENT
Start: 2022-12-07 | End: 2023-02-14

## 2022-12-07 RX ORDER — FENTANYL CITRATE 50 UG/ML
50 INJECTION, SOLUTION INTRAMUSCULAR; INTRAVENOUS
Status: DISCONTINUED | OUTPATIENT
Start: 2022-12-07 | End: 2022-12-07 | Stop reason: HOSPADM

## 2022-12-07 RX ORDER — IPRATROPIUM BROMIDE AND ALBUTEROL SULFATE 2.5; .5 MG/3ML; MG/3ML
3 SOLUTION RESPIRATORY (INHALATION) ONCE AS NEEDED
Status: DISCONTINUED | OUTPATIENT
Start: 2022-12-07 | End: 2022-12-07 | Stop reason: HOSPADM

## 2022-12-07 RX ORDER — MAGNESIUM HYDROXIDE 1200 MG/15ML
LIQUID ORAL AS NEEDED
Status: DISCONTINUED | OUTPATIENT
Start: 2022-12-07 | End: 2022-12-07 | Stop reason: HOSPADM

## 2022-12-07 RX ORDER — CEPHALEXIN 500 MG/1
500 CAPSULE ORAL 2 TIMES DAILY
Qty: 8 CAPSULE | Refills: 0 | Status: SHIPPED | OUTPATIENT
Start: 2022-12-07 | End: 2023-02-14

## 2022-12-07 RX ORDER — FAMOTIDINE 10 MG/ML
INJECTION, SOLUTION INTRAVENOUS AS NEEDED
Status: DISCONTINUED | OUTPATIENT
Start: 2022-12-07 | End: 2022-12-07 | Stop reason: SURG

## 2022-12-07 RX ORDER — OXYCODONE HYDROCHLORIDE AND ACETAMINOPHEN 5; 325 MG/1; MG/1
1 TABLET ORAL ONCE AS NEEDED
Status: COMPLETED | OUTPATIENT
Start: 2022-12-07 | End: 2022-12-07

## 2022-12-07 RX ORDER — SODIUM CHLORIDE 9 MG/ML
40 INJECTION, SOLUTION INTRAVENOUS AS NEEDED
Status: DISCONTINUED | OUTPATIENT
Start: 2022-12-07 | End: 2022-12-07 | Stop reason: HOSPADM

## 2022-12-07 RX ORDER — GENTAMICIN SULFATE 80 MG/100ML
80 INJECTION, SOLUTION INTRAVENOUS ONCE
Status: COMPLETED | OUTPATIENT
Start: 2022-12-07 | End: 2022-12-07

## 2022-12-07 RX ORDER — BUPIVACAINE HYDROCHLORIDE AND EPINEPHRINE 5; 5 MG/ML; UG/ML
INJECTION, SOLUTION PERINEURAL AS NEEDED
Status: DISCONTINUED | OUTPATIENT
Start: 2022-12-07 | End: 2022-12-07 | Stop reason: HOSPADM

## 2022-12-07 RX ORDER — SODIUM CHLORIDE 0.9 % (FLUSH) 0.9 %
10 SYRINGE (ML) INJECTION EVERY 12 HOURS SCHEDULED
Status: DISCONTINUED | OUTPATIENT
Start: 2022-12-07 | End: 2022-12-07 | Stop reason: HOSPADM

## 2022-12-07 RX ORDER — PROPOFOL 10 MG/ML
VIAL (ML) INTRAVENOUS AS NEEDED
Status: DISCONTINUED | OUTPATIENT
Start: 2022-12-07 | End: 2022-12-07 | Stop reason: SURG

## 2022-12-07 RX ORDER — ONDANSETRON 2 MG/ML
INJECTION INTRAMUSCULAR; INTRAVENOUS AS NEEDED
Status: DISCONTINUED | OUTPATIENT
Start: 2022-12-07 | End: 2022-12-07 | Stop reason: SURG

## 2022-12-07 RX ORDER — LIDOCAINE HYDROCHLORIDE 20 MG/ML
INJECTION, SOLUTION EPIDURAL; INFILTRATION; INTRACAUDAL; PERINEURAL AS NEEDED
Status: DISCONTINUED | OUTPATIENT
Start: 2022-12-07 | End: 2022-12-07 | Stop reason: SURG

## 2022-12-07 RX ORDER — ONDANSETRON 2 MG/ML
4 INJECTION INTRAMUSCULAR; INTRAVENOUS AS NEEDED
Status: DISCONTINUED | OUTPATIENT
Start: 2022-12-07 | End: 2022-12-07 | Stop reason: HOSPADM

## 2022-12-07 RX ORDER — MEPERIDINE HYDROCHLORIDE 25 MG/ML
12.5 INJECTION INTRAMUSCULAR; INTRAVENOUS; SUBCUTANEOUS
Status: DISCONTINUED | OUTPATIENT
Start: 2022-12-07 | End: 2022-12-07 | Stop reason: HOSPADM

## 2022-12-07 RX ORDER — EPHEDRINE SULFATE 5 MG/ML
INJECTION INTRAVENOUS AS NEEDED
Status: DISCONTINUED | OUTPATIENT
Start: 2022-12-07 | End: 2022-12-07 | Stop reason: SURG

## 2022-12-07 RX ADMIN — FAMOTIDINE 20 MG: 10 INJECTION, SOLUTION INTRAVENOUS at 08:17

## 2022-12-07 RX ADMIN — EPHEDRINE SULFATE 10 MG: 5 INJECTION INTRAVENOUS at 08:29

## 2022-12-07 RX ADMIN — SODIUM CHLORIDE, POTASSIUM CHLORIDE, SODIUM LACTATE AND CALCIUM CHLORIDE: 600; 310; 30; 20 INJECTION, SOLUTION INTRAVENOUS at 08:21

## 2022-12-07 RX ADMIN — ONDANSETRON 4 MG: 2 INJECTION INTRAMUSCULAR; INTRAVENOUS at 08:37

## 2022-12-07 RX ADMIN — GENTAMICIN SULFATE 80 MG: 80 INJECTION, SOLUTION INTRAVENOUS at 08:21

## 2022-12-07 RX ADMIN — PROPOFOL 150 MG: 10 INJECTION, EMULSION INTRAVENOUS at 08:21

## 2022-12-07 RX ADMIN — OXYCODONE HYDROCHLORIDE AND ACETAMINOPHEN 1 TABLET: 5; 325 TABLET ORAL at 09:30

## 2022-12-07 RX ADMIN — LIDOCAINE HYDROCHLORIDE 60 MG: 20 INJECTION, SOLUTION EPIDURAL; INFILTRATION; INTRACAUDAL; PERINEURAL at 08:17

## 2022-12-07 RX ADMIN — FENTANYL CITRATE 100 MCG: 50 INJECTION INTRAMUSCULAR; INTRAVENOUS at 08:17

## 2022-12-07 RX ADMIN — SODIUM CHLORIDE, POTASSIUM CHLORIDE, SODIUM LACTATE AND CALCIUM CHLORIDE 125 ML/HR: 600; 310; 30; 20 INJECTION, SOLUTION INTRAVENOUS at 07:26

## 2022-12-07 RX ADMIN — EPHEDRINE SULFATE 10 MG: 5 INJECTION INTRAVENOUS at 08:26

## 2022-12-22 ENCOUNTER — OFFICE VISIT (OUTPATIENT)
Dept: UROLOGY | Facility: CLINIC | Age: 67
End: 2022-12-22

## 2022-12-22 VITALS
HEART RATE: 52 BPM | DIASTOLIC BLOOD PRESSURE: 88 MMHG | SYSTOLIC BLOOD PRESSURE: 149 MMHG | WEIGHT: 240 LBS | HEIGHT: 74 IN | BODY MASS INDEX: 30.8 KG/M2

## 2022-12-22 DIAGNOSIS — N43.3 HYDROCELE, BILATERAL: Primary | ICD-10-CM

## 2022-12-22 PROCEDURE — 99024 POSTOP FOLLOW-UP VISIT: CPT | Performed by: UROLOGY

## 2023-01-05 ENCOUNTER — OFFICE VISIT (OUTPATIENT)
Dept: GASTROENTEROLOGY | Facility: CLINIC | Age: 68
End: 2023-01-05
Payer: MEDICARE

## 2023-01-05 VITALS — HEIGHT: 74 IN | WEIGHT: 241.2 LBS | BODY MASS INDEX: 30.96 KG/M2

## 2023-01-05 DIAGNOSIS — R10.84 GENERALIZED ABDOMINAL PAIN: ICD-10-CM

## 2023-01-05 DIAGNOSIS — R14.0 BLOATING: ICD-10-CM

## 2023-01-05 DIAGNOSIS — K59.04 CHRONIC IDIOPATHIC CONSTIPATION: Primary | ICD-10-CM

## 2023-01-05 PROCEDURE — 99214 OFFICE O/P EST MOD 30 MIN: CPT | Performed by: PHYSICIAN ASSISTANT

## 2023-01-26 ENCOUNTER — OFFICE VISIT (OUTPATIENT)
Dept: UROLOGY | Facility: CLINIC | Age: 68
End: 2023-01-26
Payer: MEDICARE

## 2023-01-26 VITALS — HEIGHT: 74 IN | WEIGHT: 241 LBS | BODY MASS INDEX: 30.93 KG/M2

## 2023-01-26 DIAGNOSIS — N43.3 HYDROCELE, BILATERAL: Primary | ICD-10-CM

## 2023-01-26 PROCEDURE — 99024 POSTOP FOLLOW-UP VISIT: CPT | Performed by: UROLOGY

## 2023-02-02 PROBLEM — R14.0 BLOATING: Status: ACTIVE | Noted: 2023-02-02

## 2023-02-02 PROBLEM — R10.84 GENERALIZED ABDOMINAL PAIN: Status: ACTIVE | Noted: 2023-02-02

## 2023-02-15 ENCOUNTER — ANESTHESIA (OUTPATIENT)
Dept: PERIOP | Facility: HOSPITAL | Age: 68
End: 2023-02-15
Payer: MEDICARE

## 2023-02-15 ENCOUNTER — ANESTHESIA EVENT (OUTPATIENT)
Dept: PERIOP | Facility: HOSPITAL | Age: 68
End: 2023-02-15
Payer: MEDICARE

## 2023-02-15 ENCOUNTER — HOSPITAL ENCOUNTER (OUTPATIENT)
Facility: HOSPITAL | Age: 68
Setting detail: HOSPITAL OUTPATIENT SURGERY
Discharge: HOME | End: 2023-02-15
Attending: INTERNAL MEDICINE | Admitting: INTERNAL MEDICINE
Payer: MEDICARE

## 2023-02-15 VITALS
BODY MASS INDEX: 29.26 KG/M2 | WEIGHT: 228 LBS | HEIGHT: 74 IN | TEMPERATURE: 97.3 F | HEART RATE: 43 BPM | RESPIRATION RATE: 20 BRPM | DIASTOLIC BLOOD PRESSURE: 79 MMHG | OXYGEN SATURATION: 95 % | SYSTOLIC BLOOD PRESSURE: 133 MMHG

## 2023-02-15 DIAGNOSIS — R10.84 GENERALIZED ABDOMINAL PAIN: ICD-10-CM

## 2023-02-15 DIAGNOSIS — R14.0 BLOATING: ICD-10-CM

## 2023-02-15 PROCEDURE — 25010000002 PROPOFOL 10 MG/ML EMULSION: Performed by: NURSE ANESTHETIST, CERTIFIED REGISTERED

## 2023-02-15 PROCEDURE — 88305 TISSUE EXAM BY PATHOLOGIST: CPT

## 2023-02-15 PROCEDURE — 43239 EGD BIOPSY SINGLE/MULTIPLE: CPT | Performed by: INTERNAL MEDICINE

## 2023-02-15 RX ORDER — SODIUM CHLORIDE 0.9 % (FLUSH) 0.9 %
10 SYRINGE (ML) INJECTION EVERY 12 HOURS SCHEDULED
Status: DISCONTINUED | OUTPATIENT
Start: 2023-02-15 | End: 2023-02-15 | Stop reason: HOSPADM

## 2023-02-15 RX ORDER — IPRATROPIUM BROMIDE AND ALBUTEROL SULFATE 2.5; .5 MG/3ML; MG/3ML
3 SOLUTION RESPIRATORY (INHALATION) ONCE AS NEEDED
Status: DISCONTINUED | OUTPATIENT
Start: 2023-02-15 | End: 2023-02-15 | Stop reason: HOSPADM

## 2023-02-15 RX ORDER — MIDAZOLAM HYDROCHLORIDE 1 MG/ML
0.5 INJECTION INTRAMUSCULAR; INTRAVENOUS
Status: DISCONTINUED | OUTPATIENT
Start: 2023-02-15 | End: 2023-02-15 | Stop reason: HOSPADM

## 2023-02-15 RX ORDER — PANTOPRAZOLE SODIUM 40 MG/1
40 TABLET, DELAYED RELEASE ORAL DAILY
Qty: 90 TABLET | Refills: 3 | Status: SHIPPED | OUTPATIENT
Start: 2023-02-15

## 2023-02-15 RX ORDER — SODIUM CHLORIDE, SODIUM LACTATE, POTASSIUM CHLORIDE, CALCIUM CHLORIDE 600; 310; 30; 20 MG/100ML; MG/100ML; MG/100ML; MG/100ML
100 INJECTION, SOLUTION INTRAVENOUS ONCE AS NEEDED
Status: DISCONTINUED | OUTPATIENT
Start: 2023-02-15 | End: 2023-02-15 | Stop reason: HOSPADM

## 2023-02-15 RX ORDER — SODIUM CHLORIDE 9 MG/ML
40 INJECTION, SOLUTION INTRAVENOUS AS NEEDED
Status: DISCONTINUED | OUTPATIENT
Start: 2023-02-15 | End: 2023-02-15 | Stop reason: HOSPADM

## 2023-02-15 RX ORDER — FENTANYL CITRATE 50 UG/ML
50 INJECTION, SOLUTION INTRAMUSCULAR; INTRAVENOUS
Status: DISCONTINUED | OUTPATIENT
Start: 2023-02-15 | End: 2023-02-15 | Stop reason: HOSPADM

## 2023-02-15 RX ORDER — PROPOFOL 10 MG/ML
VIAL (ML) INTRAVENOUS AS NEEDED
Status: DISCONTINUED | OUTPATIENT
Start: 2023-02-15 | End: 2023-02-15 | Stop reason: SURG

## 2023-02-15 RX ORDER — ONDANSETRON 2 MG/ML
4 INJECTION INTRAMUSCULAR; INTRAVENOUS AS NEEDED
Status: DISCONTINUED | OUTPATIENT
Start: 2023-02-15 | End: 2023-02-15 | Stop reason: HOSPADM

## 2023-02-15 RX ORDER — OXYCODONE HYDROCHLORIDE AND ACETAMINOPHEN 5; 325 MG/1; MG/1
1 TABLET ORAL ONCE AS NEEDED
Status: DISCONTINUED | OUTPATIENT
Start: 2023-02-15 | End: 2023-02-15 | Stop reason: HOSPADM

## 2023-02-15 RX ORDER — CALCIUM POLYCARBOPHIL 625 MG
1250 TABLET ORAL DAILY
Qty: 60 TABLET | Refills: 11 | Status: SHIPPED | OUTPATIENT
Start: 2023-02-15

## 2023-02-15 RX ORDER — SODIUM CHLORIDE 0.9 % (FLUSH) 0.9 %
10 SYRINGE (ML) INJECTION AS NEEDED
Status: DISCONTINUED | OUTPATIENT
Start: 2023-02-15 | End: 2023-02-15 | Stop reason: HOSPADM

## 2023-02-15 RX ORDER — SODIUM CHLORIDE, SODIUM LACTATE, POTASSIUM CHLORIDE, CALCIUM CHLORIDE 600; 310; 30; 20 MG/100ML; MG/100ML; MG/100ML; MG/100ML
125 INJECTION, SOLUTION INTRAVENOUS ONCE
Status: DISCONTINUED | OUTPATIENT
Start: 2023-02-15 | End: 2023-02-15 | Stop reason: HOSPADM

## 2023-02-15 RX ADMIN — PROPOFOL 40 MG: 10 INJECTION, EMULSION INTRAVENOUS at 09:26

## 2023-02-15 RX ADMIN — PROPOFOL 50 MG: 10 INJECTION, EMULSION INTRAVENOUS at 09:24

## 2023-02-15 RX ADMIN — PROPOFOL 50 MG: 10 INJECTION, EMULSION INTRAVENOUS at 09:23

## 2023-02-15 RX ADMIN — PROPOFOL 40 MG: 10 INJECTION, EMULSION INTRAVENOUS at 09:28

## 2023-02-16 LAB — REF LAB TEST METHOD: NORMAL

## 2023-03-14 ENCOUNTER — OFFICE VISIT (OUTPATIENT)
Dept: ORTHOPEDIC SURGERY | Facility: CLINIC | Age: 68
End: 2023-03-14
Payer: MEDICARE

## 2023-03-14 VITALS — BODY MASS INDEX: 29.14 KG/M2 | HEIGHT: 74 IN | WEIGHT: 227.07 LBS

## 2023-03-14 DIAGNOSIS — M17.11 PRIMARY OSTEOARTHRITIS OF RIGHT KNEE: Primary | ICD-10-CM

## 2023-03-14 PROCEDURE — 99213 OFFICE O/P EST LOW 20 MIN: CPT | Performed by: PHYSICIAN ASSISTANT

## 2023-03-14 PROCEDURE — 20610 DRAIN/INJ JOINT/BURSA W/O US: CPT | Performed by: PHYSICIAN ASSISTANT

## 2023-03-14 RX ADMIN — LIDOCAINE HYDROCHLORIDE 5 ML: 10 INJECTION, SOLUTION EPIDURAL; INFILTRATION; INTRACAUDAL; PERINEURAL at 14:05

## 2023-03-28 RX ORDER — LIDOCAINE HYDROCHLORIDE 10 MG/ML
5 INJECTION, SOLUTION EPIDURAL; INFILTRATION; INTRACAUDAL; PERINEURAL
Status: COMPLETED | OUTPATIENT
Start: 2023-03-14 | End: 2023-03-14

## 2023-04-05 ENCOUNTER — OFFICE VISIT (OUTPATIENT)
Dept: GASTROENTEROLOGY | Facility: CLINIC | Age: 68
End: 2023-04-05
Payer: MEDICARE

## 2023-04-05 VITALS — BODY MASS INDEX: 29.13 KG/M2 | WEIGHT: 227 LBS | HEIGHT: 74 IN

## 2023-04-05 DIAGNOSIS — K64.1 GRADE II HEMORRHOIDS: Primary | ICD-10-CM

## 2023-04-05 PROCEDURE — 99213 OFFICE O/P EST LOW 20 MIN: CPT | Performed by: PHYSICIAN ASSISTANT

## 2023-04-05 PROCEDURE — 1160F RVW MEDS BY RX/DR IN RCRD: CPT | Performed by: PHYSICIAN ASSISTANT

## 2023-04-05 PROCEDURE — 1159F MED LIST DOCD IN RCRD: CPT | Performed by: PHYSICIAN ASSISTANT

## 2023-04-05 RX ORDER — LIDOCAINE 50 MG/G
1 OINTMENT TOPICAL
Qty: 30 G | Refills: 0 | Status: SHIPPED | OUTPATIENT
Start: 2023-04-05

## 2023-04-05 RX ORDER — HYDROCORTISONE 25 MG/G
CREAM TOPICAL 2 TIMES DAILY
Qty: 28 G | Refills: 1 | Status: SHIPPED | OUTPATIENT
Start: 2023-04-05

## 2023-11-27 ENCOUNTER — TELEPHONE (OUTPATIENT)
Dept: UROLOGY | Facility: CLINIC | Age: 68
End: 2023-11-27
Payer: COMMERCIAL

## 2024-01-29 RX ORDER — PANTOPRAZOLE SODIUM 40 MG/1
40 TABLET, DELAYED RELEASE ORAL DAILY
Qty: 90 TABLET | Refills: 3 | Status: SHIPPED | OUTPATIENT
Start: 2024-01-29

## 2024-02-09 ENCOUNTER — TELEPHONE (OUTPATIENT)
Dept: GASTROENTEROLOGY | Facility: CLINIC | Age: 69
End: 2024-02-09
Payer: COMMERCIAL

## 2024-02-13 DIAGNOSIS — K59.04 CHRONIC IDIOPATHIC CONSTIPATION: ICD-10-CM

## 2024-04-29 ENCOUNTER — OFFICE VISIT (OUTPATIENT)
Dept: CARDIOLOGY | Facility: CLINIC | Age: 69
End: 2024-04-29
Payer: MEDICARE

## 2024-04-29 VITALS
WEIGHT: 228.6 LBS | BODY MASS INDEX: 29.34 KG/M2 | HEIGHT: 74 IN | SYSTOLIC BLOOD PRESSURE: 136 MMHG | HEART RATE: 48 BPM | DIASTOLIC BLOOD PRESSURE: 84 MMHG | OXYGEN SATURATION: 95 %

## 2024-04-29 DIAGNOSIS — E78.5 HYPERLIPIDEMIA, UNSPECIFIED HYPERLIPIDEMIA TYPE: ICD-10-CM

## 2024-04-29 DIAGNOSIS — Z91.89 MULTIPLE RISK FACTORS FOR CORONARY ARTERY DISEASE: ICD-10-CM

## 2024-04-29 DIAGNOSIS — R00.1 BRADYCARDIA, SINUS: Primary | ICD-10-CM

## 2024-04-29 DIAGNOSIS — R06.00 DYSPNEA, UNSPECIFIED TYPE: ICD-10-CM

## 2024-04-29 DIAGNOSIS — R73.9 HYPERGLYCEMIA: ICD-10-CM

## 2024-04-29 DIAGNOSIS — I10 HYPERTENSION, UNSPECIFIED TYPE: ICD-10-CM

## 2024-04-29 RX ORDER — PRAVASTATIN SODIUM 10 MG
10 TABLET ORAL DAILY
COMMUNITY
Start: 2024-04-26

## 2024-04-29 RX ORDER — LOSARTAN POTASSIUM 100 MG/1
100 TABLET ORAL DAILY
COMMUNITY

## 2024-04-29 RX ORDER — ATENOLOL 25 MG/1
12.5 TABLET ORAL DAILY
Start: 2024-04-29

## 2024-04-29 RX ORDER — HYDROCHLOROTHIAZIDE 12.5 MG/1
12.5 TABLET ORAL DAILY
COMMUNITY
Start: 2024-04-25

## 2024-04-29 RX ORDER — AMLODIPINE BESYLATE 5 MG/1
5 TABLET ORAL DAILY
COMMUNITY
Start: 2024-04-13

## 2024-04-29 RX ORDER — ATENOLOL 25 MG/1
25 TABLET ORAL DAILY
COMMUNITY
Start: 2024-04-15 | End: 2024-04-29 | Stop reason: SDUPTHER

## 2024-04-30 ENCOUNTER — PATIENT ROUNDING (BHMG ONLY) (OUTPATIENT)
Dept: CARDIOLOGY | Facility: CLINIC | Age: 69
End: 2024-04-30
Payer: COMMERCIAL

## 2024-05-10 ENCOUNTER — TELEPHONE (OUTPATIENT)
Dept: CARDIOLOGY | Facility: CLINIC | Age: 69
End: 2024-05-10
Payer: COMMERCIAL

## 2024-05-13 ENCOUNTER — TELEPHONE (OUTPATIENT)
Dept: CARDIOLOGY | Facility: CLINIC | Age: 69
End: 2024-05-13
Payer: COMMERCIAL

## 2024-06-20 ENCOUNTER — HOSPITAL ENCOUNTER (OUTPATIENT)
Dept: CARDIOLOGY | Facility: HOSPITAL | Age: 69
Discharge: HOME | End: 2024-06-20
Payer: MEDICARE

## 2024-06-20 ENCOUNTER — HOSPITAL ENCOUNTER (OUTPATIENT)
Dept: NUCLEAR MEDICINE | Facility: HOSPITAL | Age: 69
Discharge: HOME | End: 2024-06-20
Payer: MEDICARE

## 2024-06-20 DIAGNOSIS — R06.00 DYSPNEA, UNSPECIFIED TYPE: ICD-10-CM

## 2024-06-20 LAB
BH CV ECHO MEAS - ACS: 1.6 CM
BH CV ECHO MEAS - AO MAX PG: 6.4 MMHG
BH CV ECHO MEAS - AO MEAN PG: 3 MMHG
BH CV ECHO MEAS - AO ROOT DIAM: 3.8 CM
BH CV ECHO MEAS - AO V2 MAX: 126 CM/SEC
BH CV ECHO MEAS - AO V2 VTI: 31 CM
BH CV ECHO MEAS - EDV(CUBED): 135.4 ML
BH CV ECHO MEAS - EDV(MOD-SP4): 131 ML
BH CV ECHO MEAS - EF(MOD-SP4): 71.5 %
BH CV ECHO MEAS - ESV(CUBED): 40.4 ML
BH CV ECHO MEAS - ESV(MOD-SP4): 37.4 ML
BH CV ECHO MEAS - FS: 33.2 %
BH CV ECHO MEAS - IVS/LVPW: 1.08 CM
BH CV ECHO MEAS - IVSD: 1.27 CM
BH CV ECHO MEAS - LA DIMENSION: 3.8 CM
BH CV ECHO MEAS - LAT PEAK E' VEL: 12.2 CM/SEC
BH CV ECHO MEAS - LV DIASTOLIC VOL/BSA (35-75): 57 CM2
BH CV ECHO MEAS - LV MASS(C)D: 251 GRAMS
BH CV ECHO MEAS - LV SYSTOLIC VOL/BSA (12-30): 16.3 CM2
BH CV ECHO MEAS - LVIDD: 5.1 CM
BH CV ECHO MEAS - LVIDS: 3.4 CM
BH CV ECHO MEAS - LVOT AREA: 4.2 CM2
BH CV ECHO MEAS - LVOT DIAM: 2.3 CM
BH CV ECHO MEAS - LVPWD: 1.18 CM
BH CV ECHO MEAS - MED PEAK E' VEL: 9.3 CM/SEC
BH CV ECHO MEAS - MV A MAX VEL: 77.7 CM/SEC
BH CV ECHO MEAS - MV E MAX VEL: 113 CM/SEC
BH CV ECHO MEAS - MV E/A: 1.45
BH CV ECHO MEAS - PA ACC TIME: 0.1 SEC
BH CV ECHO MEAS - RAP SYSTOLE: 10 MMHG
BH CV ECHO MEAS - RVSP: 34.2 MMHG
BH CV ECHO MEAS - SV(MOD-SP4): 93.6 ML
BH CV ECHO MEAS - SVI(MOD-SP4): 40.7 ML/M2
BH CV ECHO MEAS - TAPSE (>1.6): 2.42 CM
BH CV ECHO MEAS - TR MAX PG: 24.2 MMHG
BH CV ECHO MEAS - TR MAX VEL: 246 CM/SEC
BH CV ECHO MEASUREMENTS AVERAGE E/E' RATIO: 10.51
BH CV NUCLEAR PRIOR STUDY: 3
BH CV REST NUCLEAR ISOTOPE DOSE: 10.1 MCI
BH CV STRESS BP STAGE 1: NORMAL
BH CV STRESS BP STAGE 2: NORMAL
BH CV STRESS COMMENTS STAGE 1: NORMAL
BH CV STRESS COMMENTS STAGE 2: NORMAL
BH CV STRESS DOSE REGADENOSON STAGE 1: 0.4
BH CV STRESS DURATION MIN STAGE 1: 3
BH CV STRESS DURATION MIN STAGE 2: 3
BH CV STRESS DURATION MIN STAGE 3: 3
BH CV STRESS DURATION SEC STAGE 1: 0
BH CV STRESS DURATION SEC STAGE 2: 0
BH CV STRESS DURATION SEC STAGE 3: 0
BH CV STRESS GRADE STAGE 1: 10
BH CV STRESS GRADE STAGE 3: 14
BH CV STRESS HR STAGE 1: 82
BH CV STRESS HR STAGE 2: 102
BH CV STRESS HR STAGE 3: 131
BH CV STRESS METS STAGE 1: 5
BH CV STRESS METS STAGE 3: 10
BH CV STRESS NUCLEAR ISOTOPE DOSE: 29.5 MCI
BH CV STRESS PROTOCOL 1: NORMAL
BH CV STRESS RECOVERY BP: NORMAL MMHG
BH CV STRESS RECOVERY HR: 68 BPM
BH CV STRESS SPEED STAGE 1: 1.7
BH CV STRESS SPEED STAGE 3: 3.4
BH CV STRESS STAGE 1: 1
BH CV STRESS STAGE 2: 2
BH CV STRESS STAGE 3: 3
LEFT ATRIUM VOLUME INDEX: 27.9 ML/M2
LV EF NUC BP: 72 %
MAXIMAL PREDICTED HEART RATE: 151 BPM
PERCENT MAX PREDICTED HR: 86.75 %
STRESS BASELINE BP: NORMAL MMHG
STRESS BASELINE HR: 54 BPM
STRESS PERCENT HR: 102 %
STRESS POST ESTIMATED WORKLOAD: 10.1 METS
STRESS POST EXERCISE DUR MIN: 8 MIN
STRESS POST EXERCISE DUR SEC: 1 SEC
STRESS POST PEAK BP: NORMAL MMHG
STRESS POST PEAK HR: 131 BPM
STRESS TARGET HR: 128 BPM

## 2024-06-20 PROCEDURE — 93306 TTE W/DOPPLER COMPLETE: CPT

## 2024-06-20 PROCEDURE — A9500 TC99M SESTAMIBI: HCPCS | Performed by: NURSE PRACTITIONER

## 2024-06-20 PROCEDURE — 78452 HT MUSCLE IMAGE SPECT MULT: CPT

## 2024-06-20 PROCEDURE — 0 TECHNETIUM SESTAMIBI: Performed by: NURSE PRACTITIONER

## 2024-06-20 PROCEDURE — 93017 CV STRESS TEST TRACING ONLY: CPT

## 2024-06-20 RX ADMIN — TECHNETIUM TC 99M SESTAMIBI 1 DOSE: 1 INJECTION INTRAVENOUS at 08:14

## 2024-06-20 RX ADMIN — TECHNETIUM TC 99M SESTAMIBI 1 DOSE: 1 INJECTION INTRAVENOUS at 09:42

## 2024-06-25 ENCOUNTER — OFFICE VISIT (OUTPATIENT)
Dept: CARDIOLOGY | Facility: CLINIC | Age: 69
End: 2024-06-25
Payer: MEDICARE

## 2024-06-25 VITALS
BODY MASS INDEX: 30.16 KG/M2 | DIASTOLIC BLOOD PRESSURE: 64 MMHG | SYSTOLIC BLOOD PRESSURE: 110 MMHG | HEIGHT: 74 IN | WEIGHT: 235 LBS | OXYGEN SATURATION: 96 % | HEART RATE: 53 BPM

## 2024-06-25 DIAGNOSIS — Z91.89 MULTIPLE RISK FACTORS FOR CORONARY ARTERY DISEASE: ICD-10-CM

## 2024-06-25 DIAGNOSIS — I10 HYPERTENSION, UNSPECIFIED TYPE: ICD-10-CM

## 2024-06-25 DIAGNOSIS — R73.9 HYPERGLYCEMIA: ICD-10-CM

## 2024-06-25 DIAGNOSIS — E78.5 HYPERLIPIDEMIA, UNSPECIFIED HYPERLIPIDEMIA TYPE: ICD-10-CM

## 2024-06-25 DIAGNOSIS — R00.1 BRADYCARDIA, SINUS: Primary | ICD-10-CM

## 2024-06-25 PROCEDURE — 1159F MED LIST DOCD IN RCRD: CPT | Performed by: NURSE PRACTITIONER

## 2024-06-25 PROCEDURE — 99213 OFFICE O/P EST LOW 20 MIN: CPT | Performed by: NURSE PRACTITIONER

## 2024-06-25 PROCEDURE — 1160F RVW MEDS BY RX/DR IN RCRD: CPT | Performed by: NURSE PRACTITIONER

## 2024-07-17 ENCOUNTER — OFFICE VISIT (OUTPATIENT)
Dept: GASTROENTEROLOGY | Facility: CLINIC | Age: 69
End: 2024-07-17
Payer: MEDICARE

## 2024-07-17 VITALS
BODY MASS INDEX: 3.08 KG/M2 | DIASTOLIC BLOOD PRESSURE: 72 MMHG | WEIGHT: 24 LBS | HEIGHT: 74 IN | SYSTOLIC BLOOD PRESSURE: 133 MMHG | HEART RATE: 52 BPM

## 2024-07-17 DIAGNOSIS — Z90.49 STATUS POST COLON RESECTION: ICD-10-CM

## 2024-07-17 DIAGNOSIS — K21.9 GASTROESOPHAGEAL REFLUX DISEASE, UNSPECIFIED WHETHER ESOPHAGITIS PRESENT: Primary | ICD-10-CM

## 2024-07-17 DIAGNOSIS — K59.04 CHRONIC IDIOPATHIC CONSTIPATION: ICD-10-CM

## 2024-07-17 DIAGNOSIS — K64.4 EXTERNAL HEMORRHOIDS: ICD-10-CM

## 2024-07-17 DIAGNOSIS — Z87.19 HISTORY OF DIVERTICULITIS: ICD-10-CM

## 2024-07-17 PROCEDURE — 99214 OFFICE O/P EST MOD 30 MIN: CPT | Performed by: NURSE PRACTITIONER

## 2024-07-17 PROCEDURE — 1160F RVW MEDS BY RX/DR IN RCRD: CPT | Performed by: NURSE PRACTITIONER

## 2024-07-17 PROCEDURE — 1159F MED LIST DOCD IN RCRD: CPT | Performed by: NURSE PRACTITIONER

## 2024-07-17 RX ORDER — PANTOPRAZOLE SODIUM 40 MG/1
40 TABLET, DELAYED RELEASE ORAL DAILY
Qty: 90 TABLET | Refills: 3 | Status: SHIPPED | OUTPATIENT
Start: 2024-07-17

## 2024-12-17 ENCOUNTER — OFFICE VISIT (OUTPATIENT)
Dept: CARDIOLOGY | Facility: CLINIC | Age: 69
End: 2024-12-17
Payer: MEDICARE

## 2024-12-17 VITALS
OXYGEN SATURATION: 93 % | DIASTOLIC BLOOD PRESSURE: 86 MMHG | SYSTOLIC BLOOD PRESSURE: 158 MMHG | HEIGHT: 74 IN | WEIGHT: 233.2 LBS | BODY MASS INDEX: 29.93 KG/M2 | HEART RATE: 63 BPM

## 2024-12-17 DIAGNOSIS — Z91.89 MULTIPLE RISK FACTORS FOR CORONARY ARTERY DISEASE: ICD-10-CM

## 2024-12-17 DIAGNOSIS — Z86.79 HISTORY OF WOLFF-PARKINSON-WHITE (WPW) SYNDROME: ICD-10-CM

## 2024-12-17 DIAGNOSIS — I10 HYPERTENSION, UNSPECIFIED TYPE: Primary | ICD-10-CM

## 2024-12-17 DIAGNOSIS — E78.5 HYPERLIPIDEMIA, UNSPECIFIED HYPERLIPIDEMIA TYPE: ICD-10-CM

## 2024-12-17 DIAGNOSIS — R73.9 HYPERGLYCEMIA: ICD-10-CM

## 2024-12-17 PROCEDURE — 1159F MED LIST DOCD IN RCRD: CPT | Performed by: NURSE PRACTITIONER

## 2024-12-17 PROCEDURE — 99214 OFFICE O/P EST MOD 30 MIN: CPT | Performed by: NURSE PRACTITIONER

## 2024-12-17 PROCEDURE — 1160F RVW MEDS BY RX/DR IN RCRD: CPT | Performed by: NURSE PRACTITIONER

## 2024-12-17 RX ORDER — AMLODIPINE BESYLATE 5 MG/1
10 TABLET ORAL DAILY
Qty: 180 TABLET | Refills: 1 | Status: SHIPPED | OUTPATIENT
Start: 2024-12-17

## 2024-12-17 RX ORDER — ATENOLOL 25 MG/1
25 TABLET ORAL DAILY
Qty: 90 TABLET | Refills: 1 | Status: SHIPPED | OUTPATIENT
Start: 2024-12-17

## 2024-12-17 NOTE — PROGRESS NOTES
Subjective     Germain Perez is a 69 y.o. male.   Chief Complaint   Patient presents with    Hypertension     History of Present Illness   Germain Perez is a 69-year-old male who presents to clinic today for cardiology follow-up.  He denies acute complaint and feels overall well.     Hypertension currently on losartan 100 mg daily, Atenolol 25 mg daily, HCTZ 12.5 mg daily and amlodipine 5 mg daily.  He reports intermittent monitoring at home with some noted elevated readings. He was previously on atenolol/chlorthalidone but discontinued.  He denies chest pain or dyspnea.    History of WPW but denies treatment. He remains on Atenolol. He denies dizziness, palpitations, bradycardia, tachycardia or syncope.  Holter monitor in May 2024 showing occasional PACs/PVCs however no A-fib pauses or WPW detected.     Hyperlipidemia currently on Pravachol 10 mg nightly.  He reports he was previously on Lipitor but could not tolerate due to side effects and discontinued.  He reports tolerating Pravachol better.  No recent labs available for review.  He denies chest pain, dyspnea or palpitations.       Hyperglycemia but no diagnosis of DM however is currently managed with lifestyle.    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 shoulder    Chronic idiopathic constipation    History of resection of large bowel    Encounter for screening for malignant neoplasm of colon    Hydrocele, bilateral    Bloating    Generalized abdominal pain     Past Medical History:   Diagnosis Date    Anxiety     Arthritis     KNEES    Cancer     MELANOMA, SQUAMOUS AND BASAL CELL    Depression     Elevated cholesterol     GERD (gastroesophageal reflux disease)     Hiatal hernia     Hyperlipidemia     Hypertension     Knee swelling 06/17/2003    Approximate date    ROLANDO (obstructive sleep apnea)     weras c-pap    Subluxation of patella 04/07/2022    WPW (Jennifer-Parkinson-White syndrome)      Past  Surgical History:   Procedure Laterality Date    APPENDECTOMY      CARDIAC CATHETERIZATION      no stents    COLON SURGERY      COLON RESECTION    COLONOSCOPY      COLONOSCOPY N/A 11/10/2022    Procedure: COLONOSCOPY;  Surgeon: Joan Soto MD;  Location:  [facility];  Service: Gastroenterology;  Laterality: N/A;    ENDOSCOPY      ENDOSCOPY N/A 02/15/2023    Procedure: ESOPHAGOGASTRODUODENOSCOPY WITH BIOPSY;  Surgeon: Joan Soto MD;  Location:  [facility];  Service: Gastroenterology;  Laterality: N/A;    FOOT SURGERY      Dr Miller was my surgeon    HERNIA REPAIR      HYDROCELECTOMY Left 2022    Procedure: HYDROCELECTOMY;  Surgeon: Srikanth Harris MD;  Location:  [facility];  Service: Urology;  Laterality: Left;    INGUINAL HERNIA REPAIR      SENTINEL NODE BIOPSY Left 2018    Procedure: SENTINEL NODE BIOPSY LEFT;  Surgeon: Wili Chow MD;  Location:  [facility];  Service: General    SKIN BIOPSY      13 x    UPPER GASTROINTESTINAL ENDOSCOPY      WIDE EXCISION LESION/MASS TRUNK Left 2018    Procedure: WIDE EXCISION MELANOMA LEFT ARM, LYMPHOSCINTIGRAM;  Surgeon: Wili Chow MD;  Location:  [facility];  Service: General     History reviewed. No pertinent family history.  Social History     Tobacco Use    Smoking status: Former     Current packs/day: 0.00     Average packs/day: 1 pack/day for 29.0 years (29.0 ttl pk-yrs)     Types: Cigarettes     Start date: 1973     Quit date: 2002     Years since quittin.9     Passive exposure: Past    Smokeless tobacco: Never    Tobacco comments:     QUIT 12 YEARS AGO   Vaping Use    Vaping status: Never Used   Substance Use Topics    Alcohol use: Yes     Alcohol/week: 1.0 standard drink of alcohol     Types: 1 Glasses of wine per week    Drug use: No     The following portions of the patient's history were reviewed and updated as appropriate: allergies, current medications, past family  history, past medical history, past social history, past surgical history and problem list.    Allergies   Allergen Reactions    Morphine Nausea And Vomiting         Current Outpatient Medications:     amLODIPine (NORVASC) 5 MG tablet, Take 2 tablets by mouth Daily. for blood pressure, Disp: 180 tablet, Rfl: 1    aspirin 81 MG chewable tablet, Chew 1 tablet Daily., Disp: , Rfl:     atenolol (TENORMIN) 25 MG tablet, Take 1 tablet by mouth Daily. as directed, Disp: 90 tablet, Rfl: 1    calcium polycarbophil (FiberCon) 625 MG tablet, Take 2 tablets by mouth Daily., Disp: 60 tablet, Rfl: 11    coenzyme Q10 100 MG capsule, Take 1 capsule by mouth Daily., Disp: , Rfl:     finasteride (PROSCAR) 5 MG tablet, Take 1 tablet by mouth Daily. as directed, Disp: , Rfl:     hydroCHLOROthiazide 12.5 MG tablet, Take 1 tablet by mouth Daily., Disp: , Rfl:     Hydrocortisone, Perianal, (Anusol-HC) 2.5 % rectal cream, Insert  into the rectum 2 (Two) Times a Day., Disp: 28 g, Rfl: 1    lidocaine (XYLOCAINE) 5 % ointment, Apply 1 application topically to the appropriate area as directed Every 2 (Two) Hours As Needed for Mild Pain., Disp: 30 g, Rfl: 0    linaclotide (LINZESS) 145 MCG capsule capsule, Take 1 capsule by mouth Every Morning Before Breakfast., Disp: 30 capsule, Rfl: 11    losartan (COZAAR) 100 MG tablet, Take 1 tablet by mouth Daily. for blood pressure, Disp: , Rfl:     Multiple Vitamins-Minerals (MULTIVITAMIN AND MINERALS) liquid liquid, Take  by mouth Daily., Disp: , Rfl:     pantoprazole (PROTONIX) 40 MG EC tablet, Take 1 tablet by mouth Daily., Disp: 90 tablet, Rfl: 3    potassium chloride (K-DUR) 10 MEQ CR tablet, Take 2 tablets by mouth Daily., Disp: , Rfl:     pravastatin (PRAVACHOL) 10 MG tablet, Take 1 tablet by mouth Daily. for cholesterol, Disp: , Rfl:     sertraline (ZOLOFT) 100 MG tablet, Take 1 tablet by mouth Daily., Disp: , Rfl:     tamsulosin (FLOMAX) 0.4 MG capsule 24 hr capsule, Take 1 capsule by mouth  "Every Night., Disp: , Rfl:     Review of Systems   Constitutional:  Negative for activity change, appetite change, chills, diaphoresis, fatigue and fever.   HENT:  Negative for congestion, drooling, ear discharge, ear pain, mouth sores, nosebleeds, postnasal drip, rhinorrhea, sinus pressure, sneezing and sore throat.    Eyes:  Negative for pain, discharge and visual disturbance.   Respiratory:  Negative for cough, chest tightness, shortness of breath and wheezing.    Cardiovascular:  Negative for chest pain, palpitations and leg swelling.   Gastrointestinal:  Negative for abdominal pain, constipation, diarrhea, nausea and vomiting.   Endocrine: Negative for cold intolerance, heat intolerance, polydipsia, polyphagia and polyuria.   Musculoskeletal:  Negative for arthralgias, myalgias and neck pain.   Skin:  Negative for rash and wound.   Neurological:  Negative for dizziness, syncope, speech difficulty, weakness, light-headedness and headaches.   Hematological:  Negative for adenopathy. Does not bruise/bleed easily.   Psychiatric/Behavioral:  Negative for confusion, dysphoric mood and sleep disturbance. The patient is not nervous/anxious.    All other systems reviewed and are negative.    /86 (BP Location: Left arm, Patient Position: Sitting, Cuff Size: Adult)   Pulse 63   Ht 188 cm (74\")   Wt 106 kg (233 lb 3.2 oz)   SpO2 93%   BMI 29.94 kg/m²     BP recheck 140/70    Objective   Allergies   Allergen Reactions    Morphine Nausea And Vomiting       Physical Exam  Vitals reviewed.   Constitutional:       Appearance: Normal appearance. He is well-developed.   HENT:      Head: Normocephalic.   Eyes:      Conjunctiva/sclera: Conjunctivae normal.   Neck:      Thyroid: No thyromegaly.      Vascular: No carotid bruit or JVD.   Cardiovascular:      Rate and Rhythm: Normal rate and regular rhythm.   Pulmonary:      Effort: Pulmonary effort is normal.      Breath sounds: Normal breath sounds.   Abdominal:      " General: Bowel sounds are normal.      Palpations: Abdomen is soft. There is no splenomegaly or mass.      Tenderness: There is no abdominal tenderness.   Musculoskeletal:      Cervical back: Neck supple.      Right lower leg: No edema.      Left lower leg: No edema.   Skin:     General: Skin is warm and dry.   Neurological:      Mental Status: He is alert and oriented to person, place, and time.   Psychiatric:         Attention and Perception: Attention normal.         Mood and Affect: Mood normal.         Speech: Speech normal.         Behavior: Behavior normal. Behavior is cooperative.         Cognition and Memory: Cognition normal.         LABS  WBC   Date Value Ref Range Status   12/05/2022 6.79 3.40 - 10.80 10*3/mm3 Final     RBC   Date Value Ref Range Status   12/05/2022 4.80 4.14 - 5.80 10*6/mm3 Final     Hemoglobin   Date Value Ref Range Status   12/05/2022 15.3 13.0 - 17.7 g/dL Final     Hematocrit   Date Value Ref Range Status   12/05/2022 44.9 37.5 - 51.0 % Final     MCV   Date Value Ref Range Status   12/05/2022 93.5 79.0 - 97.0 fL Final     MCH   Date Value Ref Range Status   12/05/2022 31.9 26.6 - 33.0 pg Final     MCHC   Date Value Ref Range Status   12/05/2022 34.1 31.5 - 35.7 g/dL Final     RDW   Date Value Ref Range Status   12/05/2022 12.6 12.3 - 15.4 % Final     RDW-SD   Date Value Ref Range Status   12/05/2022 43.4 37.0 - 54.0 fl Final     MPV   Date Value Ref Range Status   12/05/2022 10.2 6.0 - 12.0 fL Final     Platelets   Date Value Ref Range Status   12/05/2022 194 140 - 450 10*3/mm3 Final       Assessment & Plan   Diagnoses and all orders for this visit:    1. Hypertension, unspecified type (Primary)  -     TSH; Future  -     CBC & Differential; Future  Elevated in clinic, will plan to increase Norvasc to 10 mg daily while continuing current  medications  He is asked to closely monitor BP and call the clinic with further concerns.    2. Hyperlipidemia, unspecified hyperlipidemia type  -      Comprehensive Metabolic Panel; Future  -     Lipid Panel; Future  -     CK; Future  Continue on statin  Heart healthy diet  Labs requested from PCP    3. History of Jennifer-Parkinson-White (WPW) syndrome  Clinically asymptomatic, will continue to monitor     4. Hyperglycemia  Tight glycemic control for overall health benefit    5. Multiple risk factors for coronary artery disease  Aggressive risk factor modification, continue to monitor with further workup as warranted    Other orders  -     amLODIPine (NORVASC) 5 MG tablet; Take 2 tablets by mouth Daily. for blood pressure  Dispense: 180 tablet; Refill: 1  -     atenolol (TENORMIN) 25 MG tablet; Take 1 tablet by mouth Daily. as directed  Dispense: 90 tablet; Refill: 1            Review of medical record    Lifestyle modifications including heart healthy diet, regular exercise, maintenance of desirable body weight and avoidance of tobacco products      Follow-up in 6 months with EKG, sooner if needed.

## 2024-12-17 NOTE — LETTER
December 23, 2024     Haroon Powell MD  [address] [address]    Patient: Germain Perez   YOB: 1955   Date of Visit: 12/17/2024       Dear Haroon Powell MD    Germain Perez was in my office today. Below is a copy of my note.    If you have questions, please do not hesitate to call me. I look forward to following Germain along with you.         Sincerely,        BREN Smith        CC: No Recipients    Subjective    Germain Perez is a 69 y.o. male.   Chief Complaint   Patient presents with   • Hypertension     History of Present Illness   Germain Perez is a 69-year-old male who presents to clinic today for cardiology follow-up.  He denies acute complaint and feels overall well.     Hypertension currently on losartan 100 mg daily, Atenolol 25 mg daily, HCTZ 12.5 mg daily and amlodipine 5 mg daily.  He reports intermittent monitoring at home with some noted elevated readings. He was previously on atenolol/chlorthalidone but discontinued.  He denies chest pain or dyspnea.    History of WPW but denies treatment. He remains on Atenolol. He denies dizziness, palpitations, bradycardia, tachycardia or syncope.  Holter monitor in May 2024 showing occasional PACs/PVCs however no A-fib pauses or WPW detected.     Hyperlipidemia currently on Pravachol 10 mg nightly.  He reports he was previously on Lipitor but could not tolerate due to side effects and discontinued.  He reports tolerating Pravachol better.  No recent labs available for review.  He denies chest pain, dyspnea or palpitations.       Hyperglycemia but no diagnosis of DM however is currently managed with lifestyle.    Patient Active Problem List   Diagnosis   • Malignant melanoma of left upper extremity including shoulder   • Chronic idiopathic constipation   • History of resection of large bowel   • Encounter for screening for malignant neoplasm of colon   • Hydrocele, bilateral   • Bloating   •  Generalized abdominal pain     Past Medical History:   Diagnosis Date   • Anxiety    • Arthritis     KNEES   • Cancer     MELANOMA, SQUAMOUS AND BASAL CELL   • Depression    • Elevated cholesterol    • GERD (gastroesophageal reflux disease)    • Hiatal hernia    • Hyperlipidemia    • Hypertension    • Knee swelling 06/17/2003    Approximate date   • ROLANDO (obstructive sleep apnea)     weras c-pap   • Subluxation of patella 04/07/2022   • WPW (Jennifer-Parkinson-White syndrome)      Past Surgical History:   Procedure Laterality Date   • APPENDECTOMY     • CARDIAC CATHETERIZATION      no stents   • COLON SURGERY      COLON RESECTION   • COLONOSCOPY     • COLONOSCOPY N/A 11/10/2022    Procedure: COLONOSCOPY;  Surgeon: Joan Soto MD;  Location:  [facility];  Service: Gastroenterology;  Laterality: N/A;   • ENDOSCOPY     • ENDOSCOPY N/A 02/15/2023    Procedure: ESOPHAGOGASTRODUODENOSCOPY WITH BIOPSY;  Surgeon: Joan Soto MD;  Location:  [facility];  Service: Gastroenterology;  Laterality: N/A;   • FOOT SURGERY  2003    Dr Miller was my surgeon   • HERNIA REPAIR     • HYDROCELECTOMY Left 12/07/2022    Procedure: HYDROCELECTOMY;  Surgeon: Srikanth Harris MD;  Location:  [facility];  Service: Urology;  Laterality: Left;   • INGUINAL HERNIA REPAIR     • SENTINEL NODE BIOPSY Left 12/17/2018    Procedure: SENTINEL NODE BIOPSY LEFT;  Surgeon: Wili Chow MD;  Location:  [facility];  Service: General   • SKIN BIOPSY      13 x   • UPPER GASTROINTESTINAL ENDOSCOPY     • WIDE EXCISION LESION/MASS TRUNK Left 12/17/2018    Procedure: WIDE EXCISION MELANOMA LEFT ARM, LYMPHOSCINTIGRAM;  Surgeon: Wili Chow MD;  Location:  [facility];  Service: General     History reviewed. No pertinent family history.  Social History     Tobacco Use   • Smoking status: Former     Current packs/day: 0.00     Average packs/day: 1 pack/day for 29.0 years (29.0 ttl pk-yrs)     Types: Cigarettes      Start date: 1973     Quit date: 2002     Years since quittin.9     Passive exposure: Past   • Smokeless tobacco: Never   • Tobacco comments:     QUIT 12 YEARS AGO   Vaping Use   • Vaping status: Never Used   Substance Use Topics   • Alcohol use: Yes     Alcohol/week: 1.0 standard drink of alcohol     Types: 1 Glasses of wine per week   • Drug use: No     The following portions of the patient's history were reviewed and updated as appropriate: allergies, current medications, past family history, past medical history, past social history, past surgical history and problem list.    Allergies   Allergen Reactions   • Morphine Nausea And Vomiting         Current Outpatient Medications:   •  amLODIPine (NORVASC) 5 MG tablet, Take 2 tablets by mouth Daily. for blood pressure, Disp: 180 tablet, Rfl: 1  •  aspirin 81 MG chewable tablet, Chew 1 tablet Daily., Disp: , Rfl:   •  atenolol (TENORMIN) 25 MG tablet, Take 1 tablet by mouth Daily. as directed, Disp: 90 tablet, Rfl: 1  •  calcium polycarbophil (FiberCon) 625 MG tablet, Take 2 tablets by mouth Daily., Disp: 60 tablet, Rfl: 11  •  coenzyme Q10 100 MG capsule, Take 1 capsule by mouth Daily., Disp: , Rfl:   •  finasteride (PROSCAR) 5 MG tablet, Take 1 tablet by mouth Daily. as directed, Disp: , Rfl:   •  hydroCHLOROthiazide 12.5 MG tablet, Take 1 tablet by mouth Daily., Disp: , Rfl:   •  Hydrocortisone, Perianal, (Anusol-HC) 2.5 % rectal cream, Insert  into the rectum 2 (Two) Times a Day., Disp: 28 g, Rfl: 1  •  lidocaine (XYLOCAINE) 5 % ointment, Apply 1 application topically to the appropriate area as directed Every 2 (Two) Hours As Needed for Mild Pain., Disp: 30 g, Rfl: 0  •  linaclotide (LINZESS) 145 MCG capsule capsule, Take 1 capsule by mouth Every Morning Before Breakfast., Disp: 30 capsule, Rfl: 11  •  losartan (COZAAR) 100 MG tablet, Take 1 tablet by mouth Daily. for blood pressure, Disp: , Rfl:   •  Multiple Vitamins-Minerals (MULTIVITAMIN AND  "MINERALS) liquid liquid, Take  by mouth Daily., Disp: , Rfl:   •  pantoprazole (PROTONIX) 40 MG EC tablet, Take 1 tablet by mouth Daily., Disp: 90 tablet, Rfl: 3  •  potassium chloride (K-DUR) 10 MEQ CR tablet, Take 2 tablets by mouth Daily., Disp: , Rfl:   •  pravastatin (PRAVACHOL) 10 MG tablet, Take 1 tablet by mouth Daily. for cholesterol, Disp: , Rfl:   •  sertraline (ZOLOFT) 100 MG tablet, Take 1 tablet by mouth Daily., Disp: , Rfl:   •  tamsulosin (FLOMAX) 0.4 MG capsule 24 hr capsule, Take 1 capsule by mouth Every Night., Disp: , Rfl:     Review of Systems   Constitutional:  Negative for activity change, appetite change, chills, diaphoresis, fatigue and fever.   HENT:  Negative for congestion, drooling, ear discharge, ear pain, mouth sores, nosebleeds, postnasal drip, rhinorrhea, sinus pressure, sneezing and sore throat.    Eyes:  Negative for pain, discharge and visual disturbance.   Respiratory:  Negative for cough, chest tightness, shortness of breath and wheezing.    Cardiovascular:  Negative for chest pain, palpitations and leg swelling.   Gastrointestinal:  Negative for abdominal pain, constipation, diarrhea, nausea and vomiting.   Endocrine: Negative for cold intolerance, heat intolerance, polydipsia, polyphagia and polyuria.   Musculoskeletal:  Negative for arthralgias, myalgias and neck pain.   Skin:  Negative for rash and wound.   Neurological:  Negative for dizziness, syncope, speech difficulty, weakness, light-headedness and headaches.   Hematological:  Negative for adenopathy. Does not bruise/bleed easily.   Psychiatric/Behavioral:  Negative for confusion, dysphoric mood and sleep disturbance. The patient is not nervous/anxious.    All other systems reviewed and are negative.    /86 (BP Location: Left arm, Patient Position: Sitting, Cuff Size: Adult)   Pulse 63   Ht 188 cm (74\")   Wt 106 kg (233 lb 3.2 oz)   SpO2 93%   BMI 29.94 kg/m²     BP recheck 140/70    Objective  Allergies "   Allergen Reactions   • Morphine Nausea And Vomiting       Physical Exam  Vitals reviewed.   Constitutional:       Appearance: Normal appearance. He is well-developed.   HENT:      Head: Normocephalic.   Eyes:      Conjunctiva/sclera: Conjunctivae normal.   Neck:      Thyroid: No thyromegaly.      Vascular: No carotid bruit or JVD.   Cardiovascular:      Rate and Rhythm: Normal rate and regular rhythm.   Pulmonary:      Effort: Pulmonary effort is normal.      Breath sounds: Normal breath sounds.   Abdominal:      General: Bowel sounds are normal.      Palpations: Abdomen is soft. There is no splenomegaly or mass.      Tenderness: There is no abdominal tenderness.   Musculoskeletal:      Cervical back: Neck supple.      Right lower leg: No edema.      Left lower leg: No edema.   Skin:     General: Skin is warm and dry.   Neurological:      Mental Status: He is alert and oriented to person, place, and time.   Psychiatric:         Attention and Perception: Attention normal.         Mood and Affect: Mood normal.         Speech: Speech normal.         Behavior: Behavior normal. Behavior is cooperative.         Cognition and Memory: Cognition normal.         LABS  WBC   Date Value Ref Range Status   12/05/2022 6.79 3.40 - 10.80 10*3/mm3 Final     RBC   Date Value Ref Range Status   12/05/2022 4.80 4.14 - 5.80 10*6/mm3 Final     Hemoglobin   Date Value Ref Range Status   12/05/2022 15.3 13.0 - 17.7 g/dL Final     Hematocrit   Date Value Ref Range Status   12/05/2022 44.9 37.5 - 51.0 % Final     MCV   Date Value Ref Range Status   12/05/2022 93.5 79.0 - 97.0 fL Final     MCH   Date Value Ref Range Status   12/05/2022 31.9 26.6 - 33.0 pg Final     MCHC   Date Value Ref Range Status   12/05/2022 34.1 31.5 - 35.7 g/dL Final     RDW   Date Value Ref Range Status   12/05/2022 12.6 12.3 - 15.4 % Final     RDW-SD   Date Value Ref Range Status   12/05/2022 43.4 37.0 - 54.0 fl Final     MPV   Date Value Ref Range Status    12/05/2022 10.2 6.0 - 12.0 fL Final     Platelets   Date Value Ref Range Status   12/05/2022 194 140 - 450 10*3/mm3 Final       Assessment & Plan  Diagnoses and all orders for this visit:    1. Hypertension, unspecified type (Primary)  -     TSH; Future  -     CBC & Differential; Future  Elevated in clinic, will plan to increase Norvasc to 10 mg daily while continuing current  medications  He is asked to closely monitor BP and call the clinic with further concerns.    2. Hyperlipidemia, unspecified hyperlipidemia type  -     Comprehensive Metabolic Panel; Future  -     Lipid Panel; Future  -     CK; Future  Continue on statin  Heart healthy diet  Labs requested from PCP    3. History of Jennifer-Parkinson-White (WPW) syndrome  Clinically asymptomatic, will continue to monitor     4. Hyperglycemia  Tight glycemic control for overall health benefit    5. Multiple risk factors for coronary artery disease  Aggressive risk factor modification, continue to monitor with further workup as warranted    Other orders  -     amLODIPine (NORVASC) 5 MG tablet; Take 2 tablets by mouth Daily. for blood pressure  Dispense: 180 tablet; Refill: 1  -     atenolol (TENORMIN) 25 MG tablet; Take 1 tablet by mouth Daily. as directed  Dispense: 90 tablet; Refill: 1            Review of medical record    Lifestyle modifications including heart healthy diet, regular exercise, maintenance of desirable body weight and avoidance of tobacco products      Follow-up in 6 months with EKG, sooner if needed.

## 2024-12-19 ENCOUNTER — TELEPHONE (OUTPATIENT)
Dept: CARDIOLOGY | Facility: CLINIC | Age: 69
End: 2024-12-19
Payer: COMMERCIAL

## 2024-12-19 NOTE — TELEPHONE ENCOUNTER
Spoke with pt, pt advised needs lab orders faxed to [facility]    Orders have been faxed

## 2024-12-19 NOTE — TELEPHONE ENCOUNTER
Caller: Germain Perez    Relationship: Self    Best call back number: [phone]    What orders are you requesting (i.e. lab or imaging): LAS    In what timeframe would the patient need to come in: ASAP    Where will you receive your lab/imaging services: [facility] PHONE NUMBER:[phone]

## 2024-12-31 ENCOUNTER — TELEPHONE (OUTPATIENT)
Dept: CARDIOLOGY | Facility: CLINIC | Age: 69
End: 2024-12-31
Payer: COMMERCIAL

## 2024-12-31 NOTE — TELEPHONE ENCOUNTER
Dariana Marquez APRN  [facility]  Reviewed labs from PCP, cholesterol is very high. If he is compliant with Pravachol he needs to increase to 20 mg and monitor. If remains high I know he had problems with Lipitor may need to consider crestor or injections for his cholesterol. Heart healthy diet recommended.      CALLED AND GIVEN MESSAGE PER GAETANO CORREIA  HE AGREED AND V/U.

## 2024-12-31 NOTE — TELEPHONE ENCOUNTER
----- Message from Dariana Marquez sent at 12/31/2024  8:28 AM EST -----  Reviewed labs from PCP, cholesterol is very high. If he is compliant with Pravachol he needs to increase to 20 mg and monitor. If remains high I know he had problems with Lipitor may need to consider crestor or injections for his cholesterol. Heart healthy diet recommended.

## 2025-05-12 ENCOUNTER — OFFICE VISIT (OUTPATIENT)
Dept: ORTHOPEDIC SURGERY | Facility: CLINIC | Age: 70
End: 2025-05-12
Payer: MEDICARE

## 2025-05-12 ENCOUNTER — HOSPITAL ENCOUNTER (OUTPATIENT)
Dept: GENERAL RADIOLOGY | Facility: HOSPITAL | Age: 70
Discharge: HOME | End: 2025-05-12
Admitting: PHYSICIAN ASSISTANT
Payer: MEDICARE

## 2025-05-12 VITALS — BODY MASS INDEX: 31.18 KG/M2 | WEIGHT: 243 LBS | HEIGHT: 74 IN

## 2025-05-12 DIAGNOSIS — M25.562 PAIN AND SWELLING OF LEFT KNEE: ICD-10-CM

## 2025-05-12 DIAGNOSIS — M25.561 RIGHT KNEE PAIN, UNSPECIFIED CHRONICITY: Primary | ICD-10-CM

## 2025-05-12 DIAGNOSIS — M25.462 PAIN AND SWELLING OF LEFT KNEE: ICD-10-CM

## 2025-05-12 DIAGNOSIS — M25.561 RIGHT KNEE PAIN, UNSPECIFIED CHRONICITY: ICD-10-CM

## 2025-05-12 PROCEDURE — 73562 X-RAY EXAM OF KNEE 3: CPT

## 2025-05-12 PROCEDURE — 73562 X-RAY EXAM OF KNEE 3: CPT | Performed by: RADIOLOGY

## 2025-05-12 RX ORDER — METHYLPREDNISOLONE ACETATE 40 MG/ML
40 INJECTION, SUSPENSION INTRA-ARTICULAR; INTRALESIONAL; INTRAMUSCULAR; SOFT TISSUE
Status: COMPLETED | OUTPATIENT
Start: 2025-05-12 | End: 2025-05-12

## 2025-05-12 RX ORDER — LIDOCAINE HYDROCHLORIDE 10 MG/ML
5 INJECTION, SOLUTION EPIDURAL; INFILTRATION; INTRACAUDAL; PERINEURAL
Status: COMPLETED | OUTPATIENT
Start: 2025-05-12 | End: 2025-05-12

## 2025-05-12 RX ORDER — ESCITALOPRAM OXALATE 10 MG/1
10 TABLET ORAL DAILY
COMMUNITY
Start: 2025-04-21

## 2025-05-12 RX ADMIN — LIDOCAINE HYDROCHLORIDE 5 ML: 10 INJECTION, SOLUTION EPIDURAL; INFILTRATION; INTRACAUDAL; PERINEURAL at 14:09

## 2025-05-12 RX ADMIN — METHYLPREDNISOLONE ACETATE 40 MG: 40 INJECTION, SUSPENSION INTRA-ARTICULAR; INTRALESIONAL; INTRAMUSCULAR; SOFT TISSUE at 14:09

## 2025-05-12 NOTE — PROGRESS NOTES
[facility] Established Patient Visit          Patient: Germain Perez  YOB: 1955  Date of Encounter: 5/12/2025  PCP: Haroon Powell MD      Subjective     Chief Complaint   Patient presents with    Right Knee - Follow-up, Pain     Pt stated injection worked extremely well until he bent down one day last week and now feels as if it is tearing, causing extreme pain.            History of Present Illness:     Germain Perez is a 67-year-old male with prior history of patella dislocation as well as exacerbation of predisposing patellofemoral osteoarthritis and tricompartmental osteoarthritic findings.  The patient has had incidences of intra-articular effusion alleviation and previous intra-articular steroid injection as well as Monovisc supplementation. The patient has been doing well for several years until most recent return over the last several weeks without increase in effusion only pain with ambulation and weightbearing.  Patient has no other new complaints.         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 shoulder    Chronic idiopathic constipation    History of resection of large bowel    Encounter for screening for malignant neoplasm of colon    Hydrocele, bilateral    Bloating    Generalized abdominal pain     Past Medical History:   Diagnosis Date    Anxiety     Arthritis     KNEES    Cancer     MELANOMA, SQUAMOUS AND BASAL CELL    Depression     Elevated cholesterol     GERD (gastroesophageal reflux disease)     Hiatal hernia     Hyperlipidemia     Hypertension     Knee swelling 06/17/2003    Approximate date    ROLANDO (obstructive sleep apnea)     weras c-pap    Subluxation of patella 04/07/2022    WPW (Jennifer-Parkinson-White syndrome)      Past Surgical History:   Procedure Laterality Date    APPENDECTOMY      CARDIAC CATHETERIZATION      no stents    COLON SURGERY      COLON RESECTION    COLONOSCOPY       COLONOSCOPY N/A 11/10/2022    Procedure: COLONOSCOPY;  Surgeon: Joan Soto MD;  Location:  [facility];  Service: Gastroenterology;  Laterality: N/A;    ENDOSCOPY      ENDOSCOPY N/A 02/15/2023    Procedure: ESOPHAGOGASTRODUODENOSCOPY WITH BIOPSY;  Surgeon: Joan Soto MD;  Location:  [facility];  Service: Gastroenterology;  Laterality: N/A;    FOOT SURGERY      Dr Miller was my surgeon    HERNIA REPAIR      HYDROCELECTOMY Left 2022    Procedure: HYDROCELECTOMY;  Surgeon: Srikanth Harris MD;  Location:  [facility];  Service: Urology;  Laterality: Left;    INGUINAL HERNIA REPAIR      SENTINEL NODE BIOPSY Left 2018    Procedure: SENTINEL NODE BIOPSY LEFT;  Surgeon: Wili Chow MD;  Location:  [facility];  Service: General    SKIN BIOPSY      13 x    UPPER GASTROINTESTINAL ENDOSCOPY      WIDE EXCISION LESION/MASS TRUNK Left 2018    Procedure: WIDE EXCISION MELANOMA LEFT ARM, LYMPHOSCINTIGRAM;  Surgeon: Wili Chow MD;  Location:  [facility];  Service: General     Social History     Occupational History    Not on file   Tobacco Use    Smoking status: Former     Current packs/day: 0.00     Average packs/day: 1 pack/day for 29.0 years (29.0 ttl pk-yrs)     Types: Cigarettes     Start date: 1973     Quit date: 2002     Years since quittin.3     Passive exposure: Past    Smokeless tobacco: Never    Tobacco comments:     QUIT 12 YEARS AGO   Vaping Use    Vaping status: Never Used   Substance and Sexual Activity    Alcohol use: Yes     Alcohol/week: 1.0 standard drink of alcohol     Types: 1 Glasses of wine per week    Drug use: No    Sexual activity: Defer     Partners: Female     Birth control/protection: None    Germain Perez  reports that he quit smoking about 23 years ago. His smoking use included cigarettes. He started smoking about 51 years ago. He has a 29 pack-year smoking history. He has been exposed to tobacco smoke. He  has never used smokeless tobacco.. I have educated him on the risk of diseases from using tobacco products such as cancer, COPD and heart disease.             Social History     Social History Narrative    Not on file     History reviewed. No pertinent family history.  Current Outpatient Medications   Medication Sig Dispense Refill    amLODIPine (NORVASC) 5 MG tablet Take 2 tablets by mouth Daily. for blood pressure 180 tablet 1    aspirin 81 MG chewable tablet Chew 1 tablet Daily.      atenolol (TENORMIN) 25 MG tablet Take 1 tablet by mouth Daily. as directed 90 tablet 1    coenzyme Q10 100 MG capsule Take 2 capsules by mouth Daily.      escitalopram (LEXAPRO) 10 MG tablet Take 1 tablet by mouth Daily.      finasteride (PROSCAR) 5 MG tablet Take 1 tablet by mouth Daily. as directed      hydroCHLOROthiazide 12.5 MG tablet Take 1 tablet by mouth Daily.      Hydrocortisone, Perianal, (Anusol-HC) 2.5 % rectal cream Insert  into the rectum 2 (Two) Times a Day. 28 g 1    lidocaine (XYLOCAINE) 5 % ointment Apply 1 application topically to the appropriate area as directed Every 2 (Two) Hours As Needed for Mild Pain. 30 g 0    linaclotide (LINZESS) 145 MCG capsule capsule Take 1 capsule by mouth Every Morning Before Breakfast. 30 capsule 11    losartan (COZAAR) 100 MG tablet Take 1 tablet by mouth Daily. for blood pressure      Multiple Vitamins-Minerals (MULTIVITAMIN AND MINERALS) liquid liquid Take  by mouth Daily.      pantoprazole (PROTONIX) 40 MG EC tablet Take 1 tablet by mouth Daily. 90 tablet 3    pravastatin (PRAVACHOL) 10 MG tablet Take 1 tablet by mouth Daily. for cholesterol      tamsulosin (FLOMAX) 0.4 MG capsule 24 hr capsule Take 1 capsule by mouth Every Night.       No current facility-administered medications for this visit.     Allergies   Allergen Reactions    Morphine Nausea And Vomiting            Review of Systems   Constitutional: Negative.   HENT: Negative.     Eyes: Negative.    Cardiovascular:  "Negative.    Respiratory: Negative.     Endocrine: Negative.    Hematologic/Lymphatic: Negative.    Skin: Negative.    Musculoskeletal:         Pertinent positives listed in HPI   Gastrointestinal: Negative.    Genitourinary:  Positive for frequency.   Neurological: Negative.    Psychiatric/Behavioral: Negative.     Allergic/Immunologic: Negative.          Objective      Vitals:    05/12/25 1329   Weight: 110 kg (243 lb)   Height: 188 cm (74\")      Patient's Body mass index is 31.2 kg/m². indicating that he is obese (BMI >30). Obesity-related health conditions include the following:  listed in PMH . Obesity is newly identified. BMI is is above average; BMI management plan is completed. We discussed portion control and increasing exercise..      Physical Exam  Vitals and nursing note reviewed.   Constitutional:       General: He is not in acute distress.     Appearance: Normal appearance. He is not ill-appearing.   HENT:      Head: Normocephalic and atraumatic.      Right Ear: External ear normal.      Left Ear: External ear normal.      Nose: Nose normal.      Mouth/Throat:      Mouth: Mucous membranes are moist.      Pharynx: Oropharynx is clear.   Eyes:      Extraocular Movements: Extraocular movements intact.      Conjunctiva/sclera: Conjunctivae normal.      Pupils: Pupils are equal, round, and reactive to light.   Cardiovascular:      Rate and Rhythm: Normal rate.      Pulses: Normal pulses.   Pulmonary:      Effort: Pulmonary effort is normal.   Abdominal:      General: There is no distension.   Musculoskeletal:      Cervical back: Normal range of motion. No rigidity.      Right knee: Bony tenderness and crepitus present. No swelling, effusion, erythema, ecchymosis or lacerations. Normal range of motion. Tenderness present over the medial joint line and patellar tendon. No lateral joint line, MCL, LCL, ACL or PCL tenderness. No LCL laxity, MCL laxity, ACL laxity or PCL laxity. Normal alignment, normal meniscus " and normal patellar mobility. Normal pulse.      Instability Tests: Anterior drawer test negative. Posterior drawer test negative. Anterior Lachman test negative. Medial Deidra test positive. Lateral Deidra test negative.   Skin:     General: Skin is warm and dry.      Capillary Refill: Capillary refill takes less than 2 seconds.   Neurological:      General: No focal deficit present.      Mental Status: He is alert and oriented to person, place, and time.   Psychiatric:         Mood and Affect: Mood normal.         Behavior: Behavior normal.           Radiology:        XR Knee 3 View Right  Result Date: 5/12/2025  1.  No evidence of an acute fracture or dislocation. 2.  Chondrocalcinosis. 3.  Joint space narrowing medially and joint space narrowing in the patellofemoral space.  This report was finalized on 5/12/2025 1:18 PM by Dr. Luis E Huff MD.          MR Right Lower Extremity Without Intravenous Contrast, Knee  7/29/2022 3:36 PM     CLINICAL HISTORY:    Knee trauma, meniscal/ligament injury suspected, xray done (Age >=  1y); M25.561-Pain in right knee; M17.11-Unilateral primary  osteoarthritis, right knee     TECHNIQUE:    Multiplanar magnetic resonance images of the right knee without  intravenous contrast.     COMPARISON:    No relevant prior studies available.     FINDINGS:   BONES/JOINTS/CARTILAGE:    Patellofemoral compartment:  Mild osteoarthritis of the patellofemoral  compartment with subcortical edema and chondromalacia.  Moderate  suprapatellar joint effusion.  The medial and lateral patella retinacula  appear intact.    Femorotibial compartments:  Calcified loose body is seen within the  anterior knee joint space along the tibial spine. Approximately 9 mm.   Faint subcortical edema of the lateral tibial plateau which appears to  be in the setting of mild osteoarthritis. No subchondral contusion.       Extensor mechanism:  See above.    Medial meniscus:  Meniscal degeneration but no surface  extending  medial meniscal tear.    Lateral meniscus:  Lateral meniscus is unremarkable.    Medial capsule/supporting structures:  Unremarkable.  Normal medial  collateral ligament.    Lateral capsule/supporting structures:  Unremarkable.  Normal lateral  collateral ligament.    Anterior cruciate ligament:  Unremarkable.    Posterior cruciate ligament:  Unremarkable.    Musculature:  Unremarkable.    Soft tissues:  Ruptured appearing Baker's cyst with fluid tracking  along the medial gastrocnemius muscle bed.     IMPRESSION:  1.  Mild osteoarthritis of the medial and patellofemoral compartments.  2.  9 mm probably calcified loose body in the anterior knee joint  compartment. Situated just anterior and lateral to the tibial insertion  fibers of the ACL.  3.  No acute appearing meniscal tear.  4.  Intact cruciate and collateral ligaments.  5.  Moderate suprapatellar joint effusion.  6.  Ruptured appearing Baker's cyst with fluid tracking along the medial  gastrocnemius muscle bed.  7.  No fracture or dislocation.     This report was finalized on 7/29/2022 4:10 PM by Dr. Francisco Zaldivar MD.      Assessment/Plan        ICD-10-CM ICD-9-CM   1. Right knee pain, unspecified chronicity  M25.561 719.46       67-year-old male with notable right knee pain predisposing knee osteoarthritis and recurrent effusion.  The patient has responded well with previous aspiration and injections as well as Monovisc infusion.  Secondary to the recent exacerbation and return the patient was provided today with a intra-articular steroid injection with 40 mg Depo-Medrol with lidocaine block injected into the intra-articular space of the right knee.  Patient tolerated procedure well.  He was instructed to return back in 4 weeks for further evaluation of the efficacy of the conservative treatment.  Patient will be a candidate to proceed with repeat infusion viscosupplementation Monovisc at return visits      Large Joint Arthrocentesis: ERIKA  knee  Date/Time: 5/12/2025 2:09 PM  Consent given by: patient  Site marked: site marked  Supporting Documentation  Indications: pain and diagnostic evaluation   Procedure Details  Location: knee - R knee  Needle size: 25 G  Approach: anterolateral  Medications administered: 5 mL lidocaine PF 1% 1 %; 40 mg methylPREDNISolone acetate 40 MG/ML  Patient tolerance: patient tolerated the procedure well with no immediate complications              This document was signed by Chandan Tony PA-C May 12, 2025    CC: Haroon Powell MD     Dictated Utilizing Dragon Dictation:   Please note that portions of this note were completed with a voice recognition program.   Part of this note may be an electronic transcription/translation of spoken language to printed text using the Dragon Dictation System.

## 2025-06-02 ENCOUNTER — OFFICE VISIT (OUTPATIENT)
Dept: ORTHOPEDIC SURGERY | Facility: CLINIC | Age: 70
End: 2025-06-02
Payer: MEDICARE

## 2025-06-02 VITALS — BODY MASS INDEX: 31.12 KG/M2 | WEIGHT: 242.51 LBS | HEIGHT: 74 IN

## 2025-06-02 DIAGNOSIS — M25.561 RIGHT KNEE PAIN, UNSPECIFIED CHRONICITY: Primary | ICD-10-CM

## 2025-06-02 DIAGNOSIS — M17.11 PRIMARY OSTEOARTHRITIS OF RIGHT KNEE: ICD-10-CM

## 2025-06-02 RX ADMIN — LIDOCAINE HYDROCHLORIDE 5 ML: 10 INJECTION, SOLUTION EPIDURAL; INFILTRATION; INTRACAUDAL; PERINEURAL at 14:34

## 2025-06-02 NOTE — PROGRESS NOTES
[facility] Surgery Established Patient Visit          Patient: Germain Perez  YOB: 1955  Date of Encounter: 6/2/2025  PCP: Haroon Powell MD      Subjective     Chief Complaint   Patient presents with    Right Knee - Pain, Follow-up           History of Present Illness:     Germain Perez is a 67-year-old male with prior history of patella dislocation as well as exacerbation of predisposing patellofemoral osteoarthritis and tricompartmental osteoarthritic findings.  The patient has had incidences of intra-articular effusion alleviation and previous intra-articular steroid injection as well as Monovisc supplementation. The patient has been doing well for several years until most recent return over the last several weeks without increase in effusion only pain with ambulation and weightbearing.  Patient received intra-articular steroid injection last office visit with decrease in overall pain and symptoms.  He presents today for further evaluation in reference to continued conservative treatment option.  patient has no other new complaints.         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 shoulder    Chronic idiopathic constipation    History of resection of large bowel    Encounter for screening for malignant neoplasm of colon    Hydrocele, bilateral    Bloating    Generalized abdominal pain     Past Medical History:   Diagnosis Date    Anxiety     Arthritis     KNEES    Cancer     MELANOMA, SQUAMOUS AND BASAL CELL    Depression     Elevated cholesterol     GERD (gastroesophageal reflux disease)     Hiatal hernia     Hyperlipidemia     Hypertension     Knee swelling 06/17/2003    Approximate date    ROLANDO (obstructive sleep apnea)     weras c-pap    Subluxation of patella 04/07/2022    WPW (Jennifer-Parkinson-White syndrome)      Past Surgical History:   Procedure Laterality Date    APPENDECTOMY      CARDIAC CATHETERIZATION       no stents    COLON SURGERY      COLON RESECTION    COLONOSCOPY      COLONOSCOPY N/A 11/10/2022    Procedure: COLONOSCOPY;  Surgeon: Joan Soto MD;  Location:  [facility];  Service: Gastroenterology;  Laterality: N/A;    ENDOSCOPY      ENDOSCOPY N/A 02/15/2023    Procedure: ESOPHAGOGASTRODUODENOSCOPY WITH BIOPSY;  Surgeon: Joan Soto MD;  Location:  [facility];  Service: Gastroenterology;  Laterality: N/A;    FOOT SURGERY      Dr Miller was my surgeon    HERNIA REPAIR      HYDROCELECTOMY Left 2022    Procedure: HYDROCELECTOMY;  Surgeon: Srikanth Harris MD;  Location:  [facility];  Service: Urology;  Laterality: Left;    INGUINAL HERNIA REPAIR      SENTINEL NODE BIOPSY Left 2018    Procedure: SENTINEL NODE BIOPSY LEFT;  Surgeon: Wili Chow MD;  Location:  [facility];  Service: General    SKIN BIOPSY      13 x    UPPER GASTROINTESTINAL ENDOSCOPY      WIDE EXCISION LESION/MASS TRUNK Left 2018    Procedure: WIDE EXCISION MELANOMA LEFT ARM, LYMPHOSCINTIGRAM;  Surgeon: Wili Chow MD;  Location:  [facility];  Service: General     Social History     Occupational History    Not on file   Tobacco Use    Smoking status: Former     Current packs/day: 0.00     Average packs/day: 1 pack/day for 29.0 years (29.0 ttl pk-yrs)     Types: Cigarettes     Start date: 1973     Quit date: 2002     Years since quittin.4     Passive exposure: Past    Smokeless tobacco: Never    Tobacco comments:     QUIT 12 YEARS AGO   Vaping Use    Vaping status: Never Used   Substance and Sexual Activity    Alcohol use: Yes     Alcohol/week: 1.0 standard drink of alcohol     Types: 1 Glasses of wine per week    Drug use: No    Sexual activity: Defer     Partners: Female     Birth control/protection: None    Germain Perez  reports that he quit smoking about 23 years ago. His smoking use included cigarettes. He started smoking about 52 years ago. He has a 29  pack-year smoking history. He has been exposed to tobacco smoke. He has never used smokeless tobacco.. I have educated him on the risk of diseases from using tobacco products such as cancer, COPD and heart disease.             Social History     Social History Narrative    Not on file     History reviewed. No pertinent family history.  Current Outpatient Medications   Medication Sig Dispense Refill    amLODIPine (NORVASC) 5 MG tablet Take 2 tablets by mouth Daily. for blood pressure 180 tablet 1    aspirin 81 MG chewable tablet Chew 1 tablet Daily.      coenzyme Q10 100 MG capsule Take 2 capsules by mouth Daily.      escitalopram (LEXAPRO) 10 MG tablet Take 1 tablet by mouth Daily.      finasteride (PROSCAR) 5 MG tablet Take 1 tablet by mouth Daily. as directed      hydroCHLOROthiazide 12.5 MG tablet Take 1 tablet by mouth Daily.      Hydrocortisone, Perianal, (Anusol-HC) 2.5 % rectal cream Insert  into the rectum 2 (Two) Times a Day. 28 g 1    lidocaine (XYLOCAINE) 5 % ointment Apply 1 application topically to the appropriate area as directed Every 2 (Two) Hours As Needed for Mild Pain. 30 g 0    linaclotide (LINZESS) 145 MCG capsule capsule Take 1 capsule by mouth Every Morning Before Breakfast. 30 capsule 11    losartan (COZAAR) 100 MG tablet Take 1 tablet by mouth Daily. for blood pressure      Multiple Vitamins-Minerals (MULTIVITAMIN AND MINERALS) liquid liquid Take  by mouth Daily.      pantoprazole (PROTONIX) 40 MG EC tablet Take 1 tablet by mouth Daily. 90 tablet 3    pravastatin (PRAVACHOL) 10 MG tablet Take 1 tablet by mouth Daily. for cholesterol      tamsulosin (FLOMAX) 0.4 MG capsule 24 hr capsule Take 1 capsule by mouth Every Night.      nebivolol (Bystolic) 5 MG tablet Take 1 tablet by mouth Daily. 30 tablet 3     No current facility-administered medications for this visit.     Allergies   Allergen Reactions    Morphine Nausea And Vomiting            Review of Systems   Constitutional: Negative.  "  HENT: Negative.     Eyes: Negative.    Cardiovascular: Negative.    Respiratory: Negative.     Endocrine: Negative.    Hematologic/Lymphatic: Negative.    Skin: Negative.    Musculoskeletal:         Pertinent positives listed in HPI   Gastrointestinal: Negative.    Genitourinary:  Positive for frequency.   Neurological: Negative.    Psychiatric/Behavioral: Negative.     Allergic/Immunologic: Negative.          Objective      Vitals:    06/02/25 0956   Weight: 110 kg (242 lb 8.1 oz)   Height: 188 cm (74.02\")      Patient's Body mass index is 31.12 kg/m². indicating that he is obese (BMI >30). Obesity-related health conditions include the following: listed in PMH. Obesity is newly identified. BMI is is above average; BMI management plan is completed. We discussed portion control and increasing exercise..      Physical Exam  Vitals and nursing note reviewed.   Constitutional:       General: He is not in acute distress.     Appearance: Normal appearance. He is not ill-appearing.   HENT:      Head: Normocephalic and atraumatic.      Right Ear: External ear normal.      Left Ear: External ear normal.      Nose: Nose normal.      Mouth/Throat:      Mouth: Mucous membranes are moist.      Pharynx: Oropharynx is clear.   Eyes:      Extraocular Movements: Extraocular movements intact.      Conjunctiva/sclera: Conjunctivae normal.      Pupils: Pupils are equal, round, and reactive to light.   Cardiovascular:      Rate and Rhythm: Normal rate.      Pulses: Normal pulses.   Pulmonary:      Effort: Pulmonary effort is normal.   Abdominal:      General: There is no distension.   Musculoskeletal:      Cervical back: Normal range of motion. No rigidity.      Right knee: Bony tenderness and crepitus present. No swelling, effusion, erythema, ecchymosis or lacerations. Normal range of motion. Tenderness present over the medial joint line and patellar tendon. No lateral joint line, MCL, LCL, ACL or PCL tenderness. No LCL laxity, MCL " laxity, ACL laxity or PCL laxity. Normal alignment, normal meniscus and normal patellar mobility. Normal pulse.      Instability Tests: Anterior drawer test negative. Posterior drawer test negative. Anterior Lachman test negative. Medial Deidra test positive. Lateral Deidra test negative.   Skin:     General: Skin is warm and dry.      Capillary Refill: Capillary refill takes less than 2 seconds.   Neurological:      General: No focal deficit present.      Mental Status: He is alert and oriented to person, place, and time.   Psychiatric:         Mood and Affect: Mood normal.         Behavior: Behavior normal.           Radiology:        No radiology results for the last 30 days.        MR Right Lower Extremity Without Intravenous Contrast, Knee  7/29/2022 3:36 PM     CLINICAL HISTORY:    Knee trauma, meniscal/ligament injury suspected, xray done (Age >=  1y); M25.561-Pain in right knee; M17.11-Unilateral primary  osteoarthritis, right knee     TECHNIQUE:    Multiplanar magnetic resonance images of the right knee without  intravenous contrast.     COMPARISON:    No relevant prior studies available.     FINDINGS:   BONES/JOINTS/CARTILAGE:    Patellofemoral compartment:  Mild osteoarthritis of the patellofemoral  compartment with subcortical edema and chondromalacia.  Moderate  suprapatellar joint effusion.  The medial and lateral patella retinacula  appear intact.    Femorotibial compartments:  Calcified loose body is seen within the  anterior knee joint space along the tibial spine. Approximately 9 mm.   Faint subcortical edema of the lateral tibial plateau which appears to  be in the setting of mild osteoarthritis. No subchondral contusion.       Extensor mechanism:  See above.    Medial meniscus:  Meniscal degeneration but no surface extending  medial meniscal tear.    Lateral meniscus:  Lateral meniscus is unremarkable.    Medial capsule/supporting structures:  Unremarkable.  Normal medial  collateral  ligament.    Lateral capsule/supporting structures:  Unremarkable.  Normal lateral  collateral ligament.    Anterior cruciate ligament:  Unremarkable.    Posterior cruciate ligament:  Unremarkable.    Musculature:  Unremarkable.    Soft tissues:  Ruptured appearing Baker's cyst with fluid tracking  along the medial gastrocnemius muscle bed.     IMPRESSION:  1.  Mild osteoarthritis of the medial and patellofemoral compartments.  2.  9 mm probably calcified loose body in the anterior knee joint  compartment. Situated just anterior and lateral to the tibial insertion  fibers of the ACL.  3.  No acute appearing meniscal tear.  4.  Intact cruciate and collateral ligaments.  5.  Moderate suprapatellar joint effusion.  6.  Ruptured appearing Baker's cyst with fluid tracking along the medial  gastrocnemius muscle bed.  With chronic pain by  3 times vascular review  7.  No fracture or dislocation.     This report was finalized on 7/29/2022 4:10 PM by Dr. Francisco Zaldivar MD.      Assessment/Plan        ICD-10-CM ICD-9-CM   1. Right knee pain, unspecified chronicity  M25.561 719.46   2. Primary osteoarthritis of right knee  M17.11 715.16         67-year-old male with notable right knee pain predisposing knee osteoarthritis and recurrent effusion.  The patient has responded well with previous aspiration and injections as well as Monovisc infusions.  Secondary to the recent exacerbation and return the patient was provided today with a intra-articular steroid injection with Monovisc with lidocaine block injected into the intra-articular space of the right knee.  Patient tolerated procedure well.  He was instructed to return back in 4 weeks for further evaluation of the efficacy of the conservative treatment.     Large Joint Arthrocentesis: R knee  Date/Time: 6/2/2025 2:34 PM  Consent given by: patient  Site marked: site marked  Timeout: Immediately prior to procedure a time out was called to verify the correct patient,  procedure, equipment, support staff and site/side marked as required   Supporting Documentation  Indications: pain and diagnostic evaluation   Procedure Details  Location: knee - R knee  Needle size: 20 G  Approach: lateral  Medications administered: 5 mL lidocaine PF 1% 1 %; 88 mg Hyaluronan 88 MG/4ML  Patient tolerance: patient tolerated the procedure well with no immediate complications              This document was signed by Chandan Tony PA-C June 2, 2025    CC: Haroon Powell MD     Dictated Utilizing Dragon Dictation:   Please note that portions of this note were completed with a voice recognition program.   Part of this note may be an electronic transcription/translation of spoken language to printed text using the Dragon Dictation System.

## 2025-06-09 ENCOUNTER — OFFICE VISIT (OUTPATIENT)
Dept: CARDIOLOGY | Facility: CLINIC | Age: 70
End: 2025-06-09
Payer: MEDICARE

## 2025-06-09 VITALS
OXYGEN SATURATION: 94 % | SYSTOLIC BLOOD PRESSURE: 150 MMHG | WEIGHT: 236 LBS | HEIGHT: 74 IN | HEART RATE: 47 BPM | DIASTOLIC BLOOD PRESSURE: 79 MMHG | BODY MASS INDEX: 30.29 KG/M2

## 2025-06-09 DIAGNOSIS — R06.00 DYSPNEA, UNSPECIFIED TYPE: ICD-10-CM

## 2025-06-09 DIAGNOSIS — Z91.89 MULTIPLE RISK FACTORS FOR CORONARY ARTERY DISEASE: ICD-10-CM

## 2025-06-09 DIAGNOSIS — R00.1 BRADYCARDIA, SINUS: ICD-10-CM

## 2025-06-09 DIAGNOSIS — I10 HYPERTENSION, UNSPECIFIED TYPE: Primary | ICD-10-CM

## 2025-06-09 DIAGNOSIS — E78.5 HYPERLIPIDEMIA, UNSPECIFIED HYPERLIPIDEMIA TYPE: ICD-10-CM

## 2025-06-09 DIAGNOSIS — R94.31 ABNORMAL EKG: ICD-10-CM

## 2025-06-09 PROCEDURE — 93000 ELECTROCARDIOGRAM COMPLETE: CPT | Performed by: NURSE PRACTITIONER

## 2025-06-09 PROCEDURE — 1159F MED LIST DOCD IN RCRD: CPT | Performed by: NURSE PRACTITIONER

## 2025-06-09 PROCEDURE — 99214 OFFICE O/P EST MOD 30 MIN: CPT | Performed by: NURSE PRACTITIONER

## 2025-06-09 PROCEDURE — 1160F RVW MEDS BY RX/DR IN RCRD: CPT | Performed by: NURSE PRACTITIONER

## 2025-06-09 RX ORDER — NEBIVOLOL 5 MG/1
5 TABLET ORAL DAILY
Qty: 30 TABLET | Refills: 3 | Status: SHIPPED | OUTPATIENT
Start: 2025-06-09

## 2025-06-09 NOTE — Clinical Note
June 9, 2025     Haroon Powell MD  [address] [address]    Patient: Germain Perez   YOB: 1955   Date of Visit: 6/9/2025       Dear Haroon Powell MD    Germian Perez was in my office today. Below is a copy of my note.    If you have questions, please do not hesitate to call me. I look forward to following Germain along with you.         Sincerely,        BREN Smith        CC: KEENAN Sena MD Shaun Patrick McKenzie, MD Subjective    Germain Perez is a 69 y.o. male.   Chief Complaint   Patient presents with    Follow-up      History of Present Illness   History of Present Illness        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 shoulder    Chronic idiopathic constipation    History of resection of large bowel    Encounter for screening for malignant neoplasm of colon    Hydrocele, bilateral    Bloating    Generalized abdominal pain     Past Medical History:   Diagnosis Date    Anxiety     Arthritis     KNEES    Cancer     MELANOMA, SQUAMOUS AND BASAL CELL    Depression     Elevated cholesterol     GERD (gastroesophageal reflux disease)     Hiatal hernia     Hyperlipidemia     Hypertension     Knee swelling 06/17/2003    Approximate date    ROLANDO (obstructive sleep apnea)     weras c-pap    Subluxation of patella 04/07/2022    WPW (Jennifer-Parkinson-White syndrome)      Past Surgical History:   Procedure Laterality Date    APPENDECTOMY      CARDIAC CATHETERIZATION      no stents    COLON SURGERY      COLON RESECTION    COLONOSCOPY      COLONOSCOPY N/A 11/10/2022    Procedure: COLONOSCOPY;  Surgeon: Joan Soto MD;  Location: [facility] OR;  Service: Gastroenterology;  Laterality: N/A;    ENDOSCOPY      ENDOSCOPY N/A 02/15/2023    Procedure: ESOPHAGOGASTRODUODENOSCOPY WITH BIOPSY;  Surgeon: Joan Soto MD;  Location: [facility] OR;  Service:  Gastroenterology;  Laterality: N/A;    FOOT SURGERY      Dr Miller was my surgeon    HERNIA REPAIR      HYDROCELECTOMY Left 2022    Procedure: HYDROCELECTOMY;  Surgeon: Srikanth Harris MD;  Location:  [facility];  Service: Urology;  Laterality: Left;    INGUINAL HERNIA REPAIR      SENTINEL NODE BIOPSY Left 2018    Procedure: SENTINEL NODE BIOPSY LEFT;  Surgeon: Wili Chow MD;  Location:  [facility];  Service: General    SKIN BIOPSY      13 x    UPPER GASTROINTESTINAL ENDOSCOPY      WIDE EXCISION LESION/MASS TRUNK Left 2018    Procedure: WIDE EXCISION MELANOMA LEFT ARM, LYMPHOSCINTIGRAM;  Surgeon: Wili Chow MD;  Location:  [facility];  Service: General       History reviewed. No pertinent family history.  Social History     Tobacco Use    Smoking status: Former     Current packs/day: 0.00     Average packs/day: 1 pack/day for 29.0 years (29.0 ttl pk-yrs)     Types: Cigarettes     Start date: 1973     Quit date: 2002     Years since quittin.4     Passive exposure: Past    Smokeless tobacco: Never    Tobacco comments:     QUIT 12 YEARS AGO   Vaping Use    Vaping status: Never Used   Substance Use Topics    Alcohol use: Yes     Alcohol/week: 1.0 standard drink of alcohol     Types: 1 Glasses of wine per week    Drug use: No         The following portions of the patient's history were reviewed and updated as appropriate: allergies, current medications, past family history, past medical history, past social history, past surgical history and problem list.    Allergies   Allergen Reactions    Morphine Nausea And Vomiting         Current Outpatient Medications:     amLODIPine (NORVASC) 5 MG tablet, Take 2 tablets by mouth Daily. for blood pressure, Disp: 180 tablet, Rfl: 1    aspirin 81 MG chewable tablet, Chew 1 tablet Daily., Disp: , Rfl:     atenolol (TENORMIN) 25 MG tablet, Take 1 tablet by mouth Daily. as directed, Disp: 90 tablet, Rfl: 1    coenzyme  "Q10 100 MG capsule, Take 2 capsules by mouth Daily., Disp: , Rfl:     escitalopram (LEXAPRO) 10 MG tablet, Take 1 tablet by mouth Daily., Disp: , Rfl:     finasteride (PROSCAR) 5 MG tablet, Take 1 tablet by mouth Daily. as directed, Disp: , Rfl:     hydroCHLOROthiazide 12.5 MG tablet, Take 1 tablet by mouth Daily., Disp: , Rfl:     Hydrocortisone, Perianal, (Anusol-HC) 2.5 % rectal cream, Insert  into the rectum 2 (Two) Times a Day., Disp: 28 g, Rfl: 1    lidocaine (XYLOCAINE) 5 % ointment, Apply 1 application topically to the appropriate area as directed Every 2 (Two) Hours As Needed for Mild Pain., Disp: 30 g, Rfl: 0    linaclotide (LINZESS) 145 MCG capsule capsule, Take 1 capsule by mouth Every Morning Before Breakfast., Disp: 30 capsule, Rfl: 11    losartan (COZAAR) 100 MG tablet, Take 1 tablet by mouth Daily. for blood pressure, Disp: , Rfl:     Multiple Vitamins-Minerals (MULTIVITAMIN AND MINERALS) liquid liquid, Take  by mouth Daily., Disp: , Rfl:     pantoprazole (PROTONIX) 40 MG EC tablet, Take 1 tablet by mouth Daily., Disp: 90 tablet, Rfl: 3    pravastatin (PRAVACHOL) 10 MG tablet, Take 1 tablet by mouth Daily. for cholesterol, Disp: , Rfl:     tamsulosin (FLOMAX) 0.4 MG capsule 24 hr capsule, Take 1 capsule by mouth Every Night., Disp: , Rfl:     Review of Systems    /79 (BP Location: Left arm, Patient Position: Sitting, Cuff Size: Adult)   Pulse (!) 47   Ht 188 cm (74.02\")   Wt 107 kg (236 lb)   SpO2 94%   BMI 30.28 kg/m²     Objective  Allergies   Allergen Reactions    Morphine Nausea And Vomiting       Physical Exam    Procedures    [unfilled]  WBC   Date Value Ref Range Status   12/05/2022 6.79 3.40 - 10.80 10*3/mm3 Final     RBC   Date Value Ref Range Status   12/05/2022 4.80 4.14 - 5.80 10*6/mm3 Final     Hemoglobin   Date Value Ref Range Status   12/05/2022 15.3 13.0 - 17.7 g/dL Final     Hematocrit   Date Value Ref Range Status   12/05/2022 44.9 37.5 - 51.0 % Final     MCV   Date " "Value Ref Range Status   12/05/2022 93.5 79.0 - 97.0 fL Final     MCH   Date Value Ref Range Status   12/05/2022 31.9 26.6 - 33.0 pg Final     MCHC   Date Value Ref Range Status   12/05/2022 34.1 31.5 - 35.7 g/dL Final     RDW   Date Value Ref Range Status   12/05/2022 12.6 12.3 - 15.4 % Final     RDW-SD   Date Value Ref Range Status   12/05/2022 43.4 37.0 - 54.0 fl Final     MPV   Date Value Ref Range Status   12/05/2022 10.2 6.0 - 12.0 fL Final     Platelets   Date Value Ref Range Status   12/05/2022 194 140 - 450 10*3/mm3 Final   LASTLAB(GLUCOSE,NA,K,CO2,CL,ANIONGAP,CREATININE,BUN,BCR,CALCIUM,EGFRIFNONA,ALKPHOS,PROTEINTOT,ALT,AST,BILITOT,ALBUMIN,GLOB,LABIL2)@  No results found for: \"CHOL\", \"TRIG\", \"HDL\", \"LDL\"    XR Knee 3 View Right  Result Date: 5/12/2025  1.  No evidence of an acute fracture or dislocation. 2.  Chondrocalcinosis. 3.  Joint space narrowing medially and joint space narrowing in the patellofemoral space.  This report was finalized on 5/12/2025 1:18 PM by Dr. Luis E Huff MD.              Assessment & Plan  There are no diagnoses linked to this encounter.  No diagnosis found.  Assessment & Plan               {[facility] [employer] Provider Statement:[id number]}      "

## 2025-06-09 NOTE — LETTER
June 9, 2025     Haroon Powell MD  [facility] [address]    Patient: Germain Perez   YOB: 1955   Date of Visit: 6/9/2025       Dear Haroon Powell MD,    Germain Perez was in my office today. Below are the relevant portions of my assessment and plan of care.           If you have questions, please do not hesitate to call me. I look forward to following Germain along with you.         Sincerely,        BREN Smith        CC: No Recipients

## 2025-06-09 NOTE — PROGRESS NOTES
Subjective     Germain Perez is a 69 y.o. male.   Chief Complaint   Patient presents with    Follow-up      History of Present Illness   History of Present Illness  Germain Perez is a 69 y.o. male who presents to the clinic today for cardiology follow up.     History of Jennifer-Parkinson-White (WPW) syndrome but has not undergone any treatment for it. He does not experience dizziness, palpitations, tachycardia, or syncope. His heart rate has been stable and is low normal range. A monitor in 05/2024 detected premature atrial contractions (PACs), premature ventricular contractions (PVCs), but no atrial fibrillation (A-fib), pauses, or WPW. He is currently on atenolol 25 mg daily.    He has hyperlipidemia and was recently started on Pravachol 20 mg, which he reports tolerating well. He is due for repeat labs. He is on Pravachol 20 mg and reports tolerating well. He denies medication side effects. He has been on Lipitor previously but was concerned about side effects.     Losartan 100 mg daily, HCTZ 12.5 mg daily, and amlodipine 10 mg daily. He reports intermittent home monitoring of his blood pressure with acceptable readings. He does not experience chest pain or dyspnea and confirms compliance with his medication regimen.He reports some mild peripheral edema, which seems to improve after resting. He does consume sodium.     MEDICATIONS  Current: Losartan 100 mg daily, atenolol 25 mg daily, HCTZ 12.5 mg daily, amlodipine 10 mg daily, Pravachol 20 mg.      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 shoulder    Chronic idiopathic constipation    History of resection of large bowel    Encounter for screening for malignant neoplasm of colon    Hydrocele, bilateral    Bloating    Generalized abdominal pain     Past Medical History:   Diagnosis Date    Anxiety     Arthritis     KNEES    Cancer     MELANOMA, SQUAMOUS AND BASAL CELL    Depression     Elevated cholesterol      GERD (gastroesophageal reflux disease)     Hiatal hernia     Hyperlipidemia     Hypertension     Knee swelling 2003    Approximate date    ROLANDO (obstructive sleep apnea)     weras c-pap    Subluxation of patella 2022    WPW (Jennifer-Parkinson-White syndrome)      Past Surgical History:   Procedure Laterality Date    APPENDECTOMY      CARDIAC CATHETERIZATION      no stents    COLON SURGERY      COLON RESECTION    COLONOSCOPY      COLONOSCOPY N/A 11/10/2022    Procedure: COLONOSCOPY;  Surgeon: Joan Soto MD;  Location:  [facility];  Service: Gastroenterology;  Laterality: N/A;    ENDOSCOPY      ENDOSCOPY N/A 02/15/2023    Procedure: ESOPHAGOGASTRODUODENOSCOPY WITH BIOPSY;  Surgeon: Joan Soto MD;  Location:  [facility];  Service: Gastroenterology;  Laterality: N/A;    FOOT SURGERY      Dr Miller was my surgeon    HERNIA REPAIR      HYDROCELECTOMY Left 2022    Procedure: HYDROCELECTOMY;  Surgeon: Srikanth Harris MD;  Location:  [facility];  Service: Urology;  Laterality: Left;    INGUINAL HERNIA REPAIR      SENTINEL NODE BIOPSY Left 2018    Procedure: SENTINEL NODE BIOPSY LEFT;  Surgeon: Wili Chow MD;  Location:  [facility];  Service: General    SKIN BIOPSY      13 x    UPPER GASTROINTESTINAL ENDOSCOPY      WIDE EXCISION LESION/MASS TRUNK Left 2018    Procedure: WIDE EXCISION MELANOMA LEFT ARM, LYMPHOSCINTIGRAM;  Surgeon: Wili Chow MD;  Location:  [facility];  Service: General       History reviewed. No pertinent family history.  Social History     Tobacco Use    Smoking status: Former     Current packs/day: 0.00     Average packs/day: 1 pack/day for 29.0 years (29.0 ttl pk-yrs)     Types: Cigarettes     Start date: 1973     Quit date: 2002     Years since quittin.4     Passive exposure: Past    Smokeless tobacco: Never    Tobacco comments:     QUIT 12 YEARS AGO   Vaping Use    Vaping status: Never Used    Substance Use Topics    Alcohol use: Yes     Alcohol/week: 1.0 standard drink of alcohol     Types: 1 Glasses of wine per week    Drug use: No         The following portions of the patient's history were reviewed and updated as appropriate: allergies, current medications, past family history, past medical history, past social history, past surgical history and problem list.    Allergies   Allergen Reactions    Morphine Nausea And Vomiting         Current Outpatient Medications:     amLODIPine (NORVASC) 5 MG tablet, Take 2 tablets by mouth Daily. for blood pressure, Disp: 180 tablet, Rfl: 1    aspirin 81 MG chewable tablet, Chew 1 tablet Daily., Disp: , Rfl:     coenzyme Q10 100 MG capsule, Take 2 capsules by mouth Daily., Disp: , Rfl:     escitalopram (LEXAPRO) 10 MG tablet, Take 1 tablet by mouth Daily., Disp: , Rfl:     finasteride (PROSCAR) 5 MG tablet, Take 1 tablet by mouth Daily. as directed, Disp: , Rfl:     hydroCHLOROthiazide 12.5 MG tablet, Take 1 tablet by mouth Daily., Disp: , Rfl:     Hydrocortisone, Perianal, (Anusol-HC) 2.5 % rectal cream, Insert  into the rectum 2 (Two) Times a Day., Disp: 28 g, Rfl: 1    lidocaine (XYLOCAINE) 5 % ointment, Apply 1 application topically to the appropriate area as directed Every 2 (Two) Hours As Needed for Mild Pain., Disp: 30 g, Rfl: 0    linaclotide (LINZESS) 145 MCG capsule capsule, Take 1 capsule by mouth Every Morning Before Breakfast., Disp: 30 capsule, Rfl: 11    losartan (COZAAR) 100 MG tablet, Take 1 tablet by mouth Daily. for blood pressure, Disp: , Rfl:     Multiple Vitamins-Minerals (MULTIVITAMIN AND MINERALS) liquid liquid, Take  by mouth Daily., Disp: , Rfl:     pantoprazole (PROTONIX) 40 MG EC tablet, Take 1 tablet by mouth Daily., Disp: 90 tablet, Rfl: 3    pravastatin (PRAVACHOL) 10 MG tablet, Take 1 tablet by mouth Daily. for cholesterol, Disp: , Rfl:     tamsulosin (FLOMAX) 0.4 MG capsule 24 hr capsule, Take 1 capsule by mouth Every Night., Disp:  ", Rfl:     nebivolol (Bystolic) 5 MG tablet, Take 1 tablet by mouth Daily., Disp: 30 tablet, Rfl: 3    Review of Systems   Constitutional:  Negative for activity change, appetite change, chills, diaphoresis, fatigue and fever.   HENT:  Negative for congestion, drooling, ear discharge, ear pain, mouth sores, nosebleeds, postnasal drip, rhinorrhea, sinus pressure, sneezing and sore throat.    Eyes:  Negative for pain, discharge and visual disturbance.   Respiratory:  Negative for cough, chest tightness, shortness of breath and wheezing.    Cardiovascular:  Negative for chest pain, palpitations and leg swelling.   Gastrointestinal:  Negative for abdominal pain, constipation, diarrhea, nausea and vomiting.   Endocrine: Negative for cold intolerance, heat intolerance, polydipsia, polyphagia and polyuria.   Musculoskeletal:  Negative for arthralgias, myalgias and neck pain.   Skin:  Negative for rash and wound.   Neurological:  Negative for dizziness, syncope, speech difficulty, weakness, light-headedness and headaches.   Hematological:  Negative for adenopathy. Does not bruise/bleed easily.   Psychiatric/Behavioral:  Negative for confusion, dysphoric mood and sleep disturbance. The patient is not nervous/anxious.    All other systems reviewed and are negative.    /79 (BP Location: Left arm, Patient Position: Sitting, Cuff Size: Adult)   Pulse (!) 47   Ht 188 cm (74.02\")   Wt 107 kg (236 lb)   SpO2 94%   BMI 30.28 kg/m²      BP recheck 169/75 HR 49    Objective   Allergies   Allergen Reactions    Morphine Nausea And Vomiting     Physical Exam  Vitals reviewed.   Constitutional:       Appearance: Normal appearance. He is well-developed and overweight.   HENT:      Head: Normocephalic.   Eyes:      Conjunctiva/sclera: Conjunctivae normal.   Neck:      Thyroid: No thyromegaly.      Vascular: No carotid bruit or JVD.   Cardiovascular:      Rate and Rhythm: Regular rhythm. Bradycardia present.   Pulmonary:      " Effort: Pulmonary effort is normal.      Breath sounds: Normal breath sounds.   Abdominal:      General: Bowel sounds are normal.      Palpations: Abdomen is soft. There is no hepatomegaly or splenomegaly.      Tenderness: There is no abdominal tenderness.   Musculoskeletal:      Cervical back: Neck supple.      Right lower leg: No edema.      Left lower leg: No edema.   Skin:     General: Skin is cool.   Neurological:      Mental Status: He is alert.   Psychiatric:         Attention and Perception: Attention normal.         Mood and Affect: Mood normal.         Speech: Speech normal.         Behavior: Behavior normal. Behavior is cooperative.         Cognition and Memory: Cognition normal.           ECG 12 Lead    Date/Time: 6/9/2025 2:19 PM  Performed by: Dariana Marquez APRN    Authorized by: Dariana Marquez APRN  Comparison: compared with previous ECG   Similar to previous ECG  Rhythm: sinus bradycardia  Rate: bradycardic  BPM: 47  Other findings: non-specific ST-T wave changes    Clinical impression: non-specific ECG  Comments: QT/QTc - 424/387          LABS  WBC   Date Value Ref Range Status   12/05/2022 6.79 3.40 - 10.80 10*3/mm3 Final     RBC   Date Value Ref Range Status   12/05/2022 4.80 4.14 - 5.80 10*6/mm3 Final     Hemoglobin   Date Value Ref Range Status   12/05/2022 15.3 13.0 - 17.7 g/dL Final     Hematocrit   Date Value Ref Range Status   12/05/2022 44.9 37.5 - 51.0 % Final     MCV   Date Value Ref Range Status   12/05/2022 93.5 79.0 - 97.0 fL Final     MCH   Date Value Ref Range Status   12/05/2022 31.9 26.6 - 33.0 pg Final     MCHC   Date Value Ref Range Status   12/05/2022 34.1 31.5 - 35.7 g/dL Final     RDW   Date Value Ref Range Status   12/05/2022 12.6 12.3 - 15.4 % Final     RDW-SD   Date Value Ref Range Status   12/05/2022 43.4 37.0 - 54.0 fl Final     MPV   Date Value Ref Range Status   12/05/2022 10.2 6.0 - 12.0 fL Final     Platelets   Date Value Ref Range Status   12/05/2022 194 140  - 450 10*3/mm3 Final     IMAGING  XR Knee 3 View Right  Result Date: 5/12/2025  1.  No evidence of an acute fracture or dislocation. 2.  Chondrocalcinosis. 3.  Joint space narrowing medially and joint space narrowing in the patellofemoral space.  This report was finalized on 5/12/2025 1:18 PM by Dr. Luis E Huff MD.              Assessment & Plan   Diagnoses and all orders for this visit:    1. Hypertension, unspecified type (Primary)  -     ECG 12 Lead    2. Hyperlipidemia, unspecified hyperlipidemia type  -     Comprehensive Metabolic Panel; Future  -     Lipid Panel; Future  -     CK; Future  -     Magnesium; Future  -     CBC & Differential; Future    3. Bradycardia, sinus    4. Abnormal EKG    5. Dyspnea, unspecified type  -     Adult Transthoracic Echo Complete W/ Cont if Necessary Per Protocol; Future  -     proBNP; Future    6. Multiple risk factors for coronary artery disease    Other orders  -     nebivolol (Bystolic) 5 MG tablet; Take 1 tablet by mouth Daily.  Dispense: 30 tablet; Refill: 3      Assessment & Plan  1. Hypertension.  He is currently on losartan 100 mg daily, atenolol 25 mg daily, HCTZ 12.5 mg daily, and amlodipine 10 mg daily. He reports intermittent home monitoring with acceptable readings and compliance with medication. He reports some mild peripheral edema but seems to improve after resting. He does consume sodium. The plan is to stop atenolol 25 mg and start Bystolic 5 mg daily. He will call the clinic with BP readings. If blood pressure remains persistently elevated, hydralazine 10 mg twice a day will be started with titrations as needed. The goal is to maintain blood pressure below 140/90, with sodium restrictions and routine long-term monitoring recommended. Norvasc maybe contributing to LE swelling.      2. Hyperlipidemia.  He is on Pravachol, recently increased to 20 mg, and reports tolerating it well. This change was due to abnormal labs from his PCP showing elevated cholesterol.  He will continue on Pravachol. Cholesterol levels will be rechecked. Depending on the results, another statin or PCSK9 inhibitors may be considered.heart healthy diet     3. Sinus bradycardia.  He is clinically asymptomatic and stable. He denies syncope. Will continue to monitor.     Some nonspecific changes were noted on EKG. Continued monitoring is planned. An echocardiogram will be repeated to rule out pericarditis as well as monitoring labs and electrolyte values.     Multiple risk factors for CAD   Recommend aggressive risk factor modification  Further workup as warranted    Follow-up in 6 weeks to discuss review testing, sooner if needed    Review of medical record

## 2025-06-10 ENCOUNTER — TELEPHONE (OUTPATIENT)
Dept: CARDIOLOGY | Facility: CLINIC | Age: 70
End: 2025-06-10
Payer: COMMERCIAL

## 2025-06-17 RX ORDER — LIDOCAINE HYDROCHLORIDE 10 MG/ML
5 INJECTION, SOLUTION EPIDURAL; INFILTRATION; INTRACAUDAL; PERINEURAL
Status: COMPLETED | OUTPATIENT
Start: 2025-06-02 | End: 2025-06-02

## 2025-06-26 ENCOUNTER — HOSPITAL ENCOUNTER (OUTPATIENT)
Dept: CARDIOLOGY | Facility: HOSPITAL | Age: 70
Discharge: HOME | End: 2025-06-26
Admitting: NURSE PRACTITIONER
Payer: MEDICARE

## 2025-06-26 DIAGNOSIS — R06.00 DYSPNEA, UNSPECIFIED TYPE: ICD-10-CM

## 2025-06-26 LAB
AORTIC DIMENSIONLESS INDEX: 0.87 (DI)
AV MEAN PRESS GRAD SYS DOP V1V2: 5 MMHG
AV VMAX SYS DOP: 144 CM/SEC
BH CV ECHO MEAS - AO MAX PG: 8.3 MMHG
BH CV ECHO MEAS - AO ROOT DIAM: 3.6 CM
BH CV ECHO MEAS - AO V2 VTI: 33.2 CM
BH CV ECHO MEAS - AVA(I,D): 3.3 CM2
BH CV ECHO MEAS - EDV(CUBED): 91.1 ML
BH CV ECHO MEAS - EDV(MOD-SP4): 49.3 ML
BH CV ECHO MEAS - EF(MOD-SP4): 65.3 %
BH CV ECHO MEAS - ESV(CUBED): 35.9 ML
BH CV ECHO MEAS - ESV(MOD-SP4): 17.1 ML
BH CV ECHO MEAS - FS: 26.7 %
BH CV ECHO MEAS - IVS/LVPW: 1.06 CM
BH CV ECHO MEAS - IVSD: 1.3 CM
BH CV ECHO MEAS - LA DIMENSION: 3.3 CM
BH CV ECHO MEAS - LAT PEAK E' VEL: 12.2 CM/SEC
BH CV ECHO MEAS - LV DIASTOLIC VOL/BSA (35-75): 21.1 CM2
BH CV ECHO MEAS - LV MASS(C)D: 319.6 GRAMS
BH CV ECHO MEAS - LV MAX PG: 5.9 MMHG
BH CV ECHO MEAS - LV MEAN PG: 3 MMHG
BH CV ECHO MEAS - LV SYSTOLIC VOL/BSA (12-30): 7.3 CM2
BH CV ECHO MEAS - LV V1 MAX: 121 CM/SEC
BH CV ECHO MEAS - LV V1 VTI: 28.9 CM
BH CV ECHO MEAS - LVIDD: 4.5 CM
BH CV ECHO MEAS - LVIDS: 3.3 CM
BH CV ECHO MEAS - LVOT AREA: 3.8 CM2
BH CV ECHO MEAS - LVOT DIAM: 2.2 CM
BH CV ECHO MEAS - LVPWD: 1.3 CM
BH CV ECHO MEAS - MED PEAK E' VEL: 7.8 CM/SEC
BH CV ECHO MEAS - MV A MAX VEL: 96.7 CM/SEC
BH CV ECHO MEAS - MV E MAX VEL: 79.6 CM/SEC
BH CV ECHO MEAS - MV E/A: 0.82
BH CV ECHO MEAS - PA ACC TIME: 0.12 SEC
BH CV ECHO MEAS - PA V2 MAX: 109 CM/SEC
BH CV ECHO MEAS - SV(LVOT): 109.9 ML
BH CV ECHO MEAS - SV(MOD-SP4): 32.2 ML
BH CV ECHO MEAS - SVI(LVOT): 47.1 ML/M2
BH CV ECHO MEAS - SVI(MOD-SP4): 13.8 ML/M2
BH CV ECHO MEAS - TAPSE (>1.6): 2.5 CM
BH CV ECHO MEASUREMENTS AVERAGE E/E' RATIO: 7.96
LEFT ATRIUM VOLUME INDEX: 14.9 ML/M2

## 2025-06-26 PROCEDURE — 93306 TTE W/DOPPLER COMPLETE: CPT

## 2025-06-27 ENCOUNTER — RESULTS FOLLOW-UP (OUTPATIENT)
Dept: CARDIOLOGY | Facility: CLINIC | Age: 70
End: 2025-06-27
Payer: COMMERCIAL

## 2025-07-02 ENCOUNTER — TELEPHONE (OUTPATIENT)
Dept: CARDIOLOGY | Facility: CLINIC | Age: 70
End: 2025-07-02
Payer: COMMERCIAL

## 2025-07-09 ENCOUNTER — LAB (OUTPATIENT)
Dept: LAB | Facility: HOSPITAL | Age: 70
End: 2025-07-09
Payer: MEDICARE

## 2025-07-09 DIAGNOSIS — R06.00 DYSPNEA, UNSPECIFIED TYPE: ICD-10-CM

## 2025-07-09 DIAGNOSIS — R00.1 BRADYCARDIA, SINUS: ICD-10-CM

## 2025-07-09 DIAGNOSIS — E78.5 HYPERLIPIDEMIA, UNSPECIFIED HYPERLIPIDEMIA TYPE: ICD-10-CM

## 2025-07-09 DIAGNOSIS — I10 HYPERTENSION, UNSPECIFIED TYPE: ICD-10-CM

## 2025-07-09 LAB
ALBUMIN SERPL-MCNC: 4.4 G/DL (ref 3.5–5.2)
ALBUMIN/GLOB SERPL: 1.8 G/DL
ALP SERPL-CCNC: 85 U/L (ref 39–117)
ALT SERPL W P-5'-P-CCNC: 30 U/L (ref 1–41)
ANION GAP SERPL CALCULATED.3IONS-SCNC: 11.9 MMOL/L (ref 5–15)
AST SERPL-CCNC: 23 U/L (ref 1–40)
BASOPHILS # BLD AUTO: 0.03 10*3/MM3 (ref 0–0.2)
BASOPHILS NFR BLD AUTO: 0.6 % (ref 0–1.5)
BILIRUB SERPL-MCNC: 0.4 MG/DL (ref 0–1.2)
BUN SERPL-MCNC: 17.4 MG/DL (ref 8–23)
BUN/CREAT SERPL: 22.3 (ref 7–25)
CALCIUM SPEC-SCNC: 9.5 MG/DL (ref 8.6–10.5)
CHLORIDE SERPL-SCNC: 107 MMOL/L (ref 98–107)
CHOLEST SERPL-MCNC: 184 MG/DL (ref 0–200)
CK SERPL-CCNC: 122 U/L (ref 20–200)
CO2 SERPL-SCNC: 24.1 MMOL/L (ref 22–29)
CREAT SERPL-MCNC: 0.78 MG/DL (ref 0.76–1.27)
DEPRECATED RDW RBC AUTO: 45.3 FL (ref 37–54)
EGFRCR SERPLBLD CKD-EPI 2021: 95.9 ML/MIN/1.73
EOSINOPHIL # BLD AUTO: 0.16 10*3/MM3 (ref 0–0.4)
EOSINOPHIL NFR BLD AUTO: 3.4 % (ref 0.3–6.2)
ERYTHROCYTE [DISTWIDTH] IN BLOOD BY AUTOMATED COUNT: 12.7 % (ref 12.3–15.4)
GLOBULIN UR ELPH-MCNC: 2.5 GM/DL
GLUCOSE SERPL-MCNC: 106 MG/DL (ref 65–99)
HCT VFR BLD AUTO: 42.5 % (ref 37.5–51)
HDLC SERPL-MCNC: 42 MG/DL (ref 40–60)
HGB BLD-MCNC: 14.3 G/DL (ref 13–17.7)
IMM GRANULOCYTES # BLD AUTO: 0.03 10*3/MM3 (ref 0–0.05)
IMM GRANULOCYTES NFR BLD AUTO: 0.6 % (ref 0–0.5)
LDLC SERPL CALC-MCNC: 122 MG/DL (ref 0–100)
LDLC/HDLC SERPL: 2.86 {RATIO}
LYMPHOCYTES # BLD AUTO: 1.58 10*3/MM3 (ref 0.7–3.1)
LYMPHOCYTES NFR BLD AUTO: 33.3 % (ref 19.6–45.3)
MAGNESIUM SERPL-MCNC: 2.2 MG/DL (ref 1.6–2.4)
MCH RBC QN AUTO: 32.3 PG (ref 26.6–33)
MCHC RBC AUTO-ENTMCNC: 33.6 G/DL (ref 31.5–35.7)
MCV RBC AUTO: 95.9 FL (ref 79–97)
MONOCYTES # BLD AUTO: 0.22 10*3/MM3 (ref 0.1–0.9)
MONOCYTES NFR BLD AUTO: 4.6 % (ref 5–12)
NEUTROPHILS NFR BLD AUTO: 2.72 10*3/MM3 (ref 1.7–7)
NEUTROPHILS NFR BLD AUTO: 57.5 % (ref 42.7–76)
NRBC BLD AUTO-RTO: 0 /100 WBC (ref 0–0.2)
NT-PROBNP SERPL-MCNC: 51.9 PG/ML (ref 0–900)
PLATELET # BLD AUTO: 184 10*3/MM3 (ref 140–450)
PMV BLD AUTO: 10 FL (ref 6–12)
POTASSIUM SERPL-SCNC: 3.9 MMOL/L (ref 3.5–5.2)
PROT SERPL-MCNC: 6.9 G/DL (ref 6–8.5)
RBC # BLD AUTO: 4.43 10*6/MM3 (ref 4.14–5.8)
SODIUM SERPL-SCNC: 143 MMOL/L (ref 136–145)
T4 FREE SERPL-MCNC: 0.98 NG/DL (ref 0.92–1.68)
TRIGL SERPL-MCNC: 109 MG/DL (ref 0–150)
TSH SERPL DL<=0.05 MIU/L-ACNC: 1.23 UIU/ML (ref 0.27–4.2)
VLDLC SERPL-MCNC: 20 MG/DL (ref 5–40)
WBC NRBC COR # BLD AUTO: 4.74 10*3/MM3 (ref 3.4–10.8)

## 2025-07-09 PROCEDURE — 84443 ASSAY THYROID STIM HORMONE: CPT

## 2025-07-09 PROCEDURE — 83735 ASSAY OF MAGNESIUM: CPT

## 2025-07-09 PROCEDURE — 36415 COLL VENOUS BLD VENIPUNCTURE: CPT

## 2025-07-09 PROCEDURE — 84439 ASSAY OF FREE THYROXINE: CPT

## 2025-07-09 PROCEDURE — 83880 ASSAY OF NATRIURETIC PEPTIDE: CPT

## 2025-07-09 PROCEDURE — 82550 ASSAY OF CK (CPK): CPT

## 2025-07-09 PROCEDURE — 80061 LIPID PANEL: CPT

## 2025-07-09 PROCEDURE — 80053 COMPREHEN METABOLIC PANEL: CPT

## 2025-07-09 PROCEDURE — 85025 COMPLETE CBC W/AUTO DIFF WBC: CPT

## 2025-07-17 ENCOUNTER — OFFICE VISIT (OUTPATIENT)
Dept: GASTROENTEROLOGY | Facility: CLINIC | Age: 70
End: 2025-07-17
Payer: MEDICARE

## 2025-07-17 VITALS
DIASTOLIC BLOOD PRESSURE: 67 MMHG | HEIGHT: 74 IN | HEART RATE: 56 BPM | SYSTOLIC BLOOD PRESSURE: 135 MMHG | WEIGHT: 234 LBS | BODY MASS INDEX: 30.03 KG/M2

## 2025-07-17 DIAGNOSIS — Z86.0100 PERSONAL HISTORY OF COLON POLYPS, UNSPECIFIED: Primary | ICD-10-CM

## 2025-07-17 DIAGNOSIS — Z87.19 HISTORY OF DIVERTICULITIS: ICD-10-CM

## 2025-07-17 DIAGNOSIS — K21.9 GASTROESOPHAGEAL REFLUX DISEASE, UNSPECIFIED WHETHER ESOPHAGITIS PRESENT: ICD-10-CM

## 2025-07-17 DIAGNOSIS — Z90.49 STATUS POST COLON RESECTION: ICD-10-CM

## 2025-07-17 DIAGNOSIS — K59.04 CHRONIC IDIOPATHIC CONSTIPATION: ICD-10-CM

## 2025-07-17 DIAGNOSIS — K64.4 EXTERNAL HEMORRHOIDS: ICD-10-CM

## 2025-07-17 RX ORDER — PANTOPRAZOLE SODIUM 40 MG/1
40 TABLET, DELAYED RELEASE ORAL DAILY
Qty: 90 TABLET | Refills: 3 | Status: SHIPPED | OUTPATIENT
Start: 2025-07-17

## 2025-07-17 NOTE — PROGRESS NOTES
DATE:  7/17/2025    DIAGNOSIS: Constipation, GERD    CHIEF COMPLAINT:  Follow-up of constipation and GERD    HPI:  Mr. Perez was previously following with Lillian Cavazos PA-C and was last seen in April 2023.  Please see previous notes for prior management.  In review of his records, he has had chronic difficulty with constipation.  Patient reports he has previously tried over-the-counter stool softeners/laxatives without improvement.  He was therefore started on Linzess 145 mcg p.o. daily which is currently controlling constipation well.  At present, he reports having daily BMs with stool type VII on [place] stool scale which is currently tolerable.  Patient does not want to reduce Linzess dose as he feels this is working well.  He feels as if he is evacuating his colon well.  He denies having melena or bright red bleeding per rectum.  Of note, he underwent colonoscopy with Dr. Soares in November 2022.  At that time, he had 1 colon polyp removed which was hyperplastic.  Due to personal history of colon polyps, repeat colonoscopy was recommended in 5 years.  Of note, patient also has history of diverticulitis and is s/p colon resection.  Since his last visit, he was evaluated by general surgery, Dr. Dobson for external hemorrhoids and underwent excision in April 2023.  In regards to GERD, this remains well-controlled with Protonix 40 mg p.o. daily.  He has no other complaints today.    INTERVAL HISTORY:  Mr. Perez presents today for follow-up.  Since his last visit, clinically he has continued to do well.  He reports his bowels are moving well with Linzess 145 mcg p.o. daily.  He is also taking a daily fiber supplement to help bulk stool.  At present, he reports having a daily BM with stool type V-VI on [place] stool scale.  He feels as if he is evacuating his colon well.  He did recently have worsening of constipation while on vacation which later improved after returning home.  In regards to GERD, this  remains well-controlled with Protonix 40 mg p.o. daily.  He denies having any recent flares.  He has no new complaints today.      PAST MEDICAL HISTORY:  Past Medical History:   Diagnosis Date    Anxiety     Arthritis     KNEES    Cancer     MELANOMA, SQUAMOUS AND BASAL CELL    Depression     Elevated cholesterol     GERD (gastroesophageal reflux disease)     Hiatal hernia     Hyperlipidemia     Hypertension     Knee swelling 06/17/2003    Approximate date    ROLANDO (obstructive sleep apnea)     weras c-pap    Subluxation of patella 04/07/2022    WPW (Jennifer-Parkinson-White syndrome)        PAST SURGICAL HISTORY:  Past Surgical History:   Procedure Laterality Date    APPENDECTOMY      CARDIAC CATHETERIZATION      no stents    COLON SURGERY      COLON RESECTION    COLONOSCOPY      COLONOSCOPY N/A 11/10/2022    Procedure: COLONOSCOPY;  Surgeon: Joan Soto MD;  Location:  [facility];  Service: Gastroenterology;  Laterality: N/A;    ENDOSCOPY      ENDOSCOPY N/A 02/15/2023    Procedure: ESOPHAGOGASTRODUODENOSCOPY WITH BIOPSY;  Surgeon: Joan Soto MD;  Location:  [facility];  Service: Gastroenterology;  Laterality: N/A;    FOOT SURGERY  2003    Dr Miller was my surgeon    HERNIA REPAIR      HYDROCELECTOMY Left 12/07/2022    Procedure: HYDROCELECTOMY;  Surgeon: Srikanth Harris MD;  Location:  [facility];  Service: Urology;  Laterality: Left;    INGUINAL HERNIA REPAIR      SENTINEL NODE BIOPSY Left 12/17/2018    Procedure: SENTINEL NODE BIOPSY LEFT;  Surgeon: Wili Chow MD;  Location:  [facility];  Service: General    SKIN BIOPSY      13 x    UPPER GASTROINTESTINAL ENDOSCOPY      WIDE EXCISION LESION/MASS TRUNK Left 12/17/2018    Procedure: WIDE EXCISION MELANOMA LEFT ARM, LYMPHOSCINTIGRAM;  Surgeon: Wili Chow MD;  Location:  [facility];  Service: General       SOCIAL HISTORY:  Social History     Socioeconomic History    Marital status:    Tobacco Use     Smoking status: Former     Current packs/day: 0.00     Average packs/day: 1 pack/day for 29.0 years (29.0 ttl pk-yrs)     Types: Cigarettes     Start date: 1973     Quit date: 2002     Years since quittin.5     Passive exposure: Past    Smokeless tobacco: Never    Tobacco comments:     QUIT 12 YEARS AGO   Vaping Use    Vaping status: Never Used   Substance and Sexual Activity    Alcohol use: Yes     Alcohol/week: 1.0 standard drink of alcohol     Types: 1 Glasses of wine per week    Drug use: No    Sexual activity: Defer     Partners: Female     Birth control/protection: None       FAMILY HISTORY:  No family history on file.      MEDICATIONS:  The current medication list was reviewed in the EMR    Current Outpatient Medications:     amLODIPine (NORVASC) 5 MG tablet, Take 2 tablets by mouth Daily. for blood pressure, Disp: 180 tablet, Rfl: 1    aspirin 81 MG chewable tablet, Chew 1 tablet Daily., Disp: , Rfl:     coenzyme Q10 100 MG capsule, Take 2 capsules by mouth Daily., Disp: , Rfl:     escitalopram (LEXAPRO) 10 MG tablet, Take 1 tablet by mouth Daily., Disp: , Rfl:     finasteride (PROSCAR) 5 MG tablet, Take 1 tablet by mouth Daily. as directed, Disp: , Rfl:     hydroCHLOROthiazide 12.5 MG tablet, Take 1 tablet by mouth Daily., Disp: , Rfl:     Hydrocortisone, Perianal, (Anusol-HC) 2.5 % rectal cream, Insert  into the rectum 2 (Two) Times a Day., Disp: 28 g, Rfl: 1    lidocaine (XYLOCAINE) 5 % ointment, Apply 1 application topically to the appropriate area as directed Every 2 (Two) Hours As Needed for Mild Pain., Disp: 30 g, Rfl: 0    linaclotide (LINZESS) 145 MCG capsule capsule, Take 1 capsule by mouth Every Morning Before Breakfast., Disp: 30 capsule, Rfl: 11    losartan (COZAAR) 100 MG tablet, Take 1 tablet by mouth Daily. for blood pressure, Disp: , Rfl:     Multiple Vitamins-Minerals (MULTIVITAMIN AND MINERALS) liquid liquid, Take  by mouth Daily., Disp: , Rfl:     nebivolol (Bystolic) 5 MG  tablet, Take 1 tablet by mouth Daily., Disp: 30 tablet, Rfl: 3    pantoprazole (PROTONIX) 40 MG EC tablet, Take 1 tablet by mouth Daily., Disp: 90 tablet, Rfl: 3    pravastatin (PRAVACHOL) 10 MG tablet, Take 1 tablet by mouth Daily. for cholesterol, Disp: , Rfl:     tamsulosin (FLOMAX) 0.4 MG capsule 24 hr capsule, Take 1 capsule by mouth Every Night., Disp: , Rfl:     ALLERGIES:    Allergies   Allergen Reactions    Morphine Nausea And Vomiting     REVIEW OF SYSTEMS:    A comprehensive 14 point review of systems was performed.  Significant findings as mentioned above.  All other systems reviewed and are negative.      Physical Exam   Vital Signs:   Vitals:    07/17/25 1303   BP: 135/67   Pulse: 56    General: Well developed, well nourished, alert and oriented x 3, in no acute distress.   Head: ATNC   Eyes: PERRL, No evidence of conjunctivitis.   Nose: No nasal discharge.   Mouth: Oral mucosal membranes moist. No oral ulceration or hemorrhages.   Neck: Neck supple. No thyromegaly. No JVD.   Lungs: Clear in all fields to A&P without rales, rhonchi or wheezing.   Heart: Regular bradycardia. No murmurs, rubs, or gallops.   Abdomen: Soft. Bowel sounds are normoactive. Nontender with palpation.   Extremities: No cyanosis or edema.  Neurologic: MS as above. Grossly non focal exam.    RECENT LABS:  Lab Results   Component Value Date    WBC 4.74 07/09/2025    HGB 14.3 07/09/2025    HCT 42.5 07/09/2025    MCV 95.9 07/09/2025    RDW 12.7 07/09/2025     07/09/2025    NEUTRORELPCT 57.5 07/09/2025    LYMPHORELPCT 33.3 07/09/2025    MONORELPCT 4.6 (L) 07/09/2025    EOSRELPCT 3.4 07/09/2025    BASORELPCT 0.6 07/09/2025    NEUTROABS 2.72 07/09/2025    LYMPHSABS 1.58 07/09/2025       Lab Results   Component Value Date     07/09/2025    K 3.9 07/09/2025    CO2 24.1 07/09/2025     07/09/2025    BUN 17.4 07/09/2025    CREATININE 0.78 07/09/2025    EGFRIFNONA 90 12/17/2018    GLUCOSE 106 (H) 07/09/2025    CALCIUM 9.5  07/09/2025    ALKPHOS 85 07/09/2025    AST 23 07/09/2025    ALT 30 07/09/2025    BILITOT 0.4 07/09/2025    ALBUMIN 4.4 07/09/2025    PROTEINTOT 6.9 07/09/2025    MG 2.2 07/09/2025       ASSESSMENT & PLAN:  Germain Perez is a very pleasant 70 y.o. male with    1.  Constipation:  2.  Personal history of colon polyps:  3.  History of diverticulitis s/p colon resection:  4.  External hemorrhoids s/p excision:    -Continue Linzess 145 mcg p.o. daily which is controlling constipation well along with daily fiber supplement.  Discussed with patient, he can also use MiraLAX 1 capful daily as needed in addition to Linzess.  As above, he did have worsening of constipation while on vacation which later improved.  -As above, he underwent colonoscopy with Dr. Soares in November 2022.  At that time, he had 1 colon polyp removed which was hyperplastic.  Due to personal history of colon polyps, repeat colonoscopy was recommended in 5 years.     5.  GERD:  -Currently remains well-controlled with Protonix 40 mg p.o. daily.  Will continue.  Refills provided.    Patient will return to clinic in 1 year for symptom check or sooner if needed.        Electronically Signed by: BREN Riley ,  July 17, 2025 10:19 EDT       CC:   Haroon Powell MD

## 2025-07-25 RX ORDER — AMLODIPINE BESYLATE 5 MG/1
10 TABLET ORAL DAILY
Qty: 180 TABLET | Refills: 1 | Status: SHIPPED | OUTPATIENT
Start: 2025-07-25 | End: 2025-07-28 | Stop reason: SDUPTHER

## 2025-07-28 ENCOUNTER — OFFICE VISIT (OUTPATIENT)
Dept: CARDIOLOGY | Facility: CLINIC | Age: 70
End: 2025-07-28
Payer: MEDICARE

## 2025-07-28 VITALS
BODY MASS INDEX: 29.67 KG/M2 | SYSTOLIC BLOOD PRESSURE: 164 MMHG | HEIGHT: 74 IN | OXYGEN SATURATION: 92 % | HEART RATE: 75 BPM | WEIGHT: 231.2 LBS | DIASTOLIC BLOOD PRESSURE: 76 MMHG

## 2025-07-28 DIAGNOSIS — I10 HYPERTENSION, UNSPECIFIED TYPE: Primary | ICD-10-CM

## 2025-07-28 DIAGNOSIS — E78.5 HYPERLIPIDEMIA, UNSPECIFIED HYPERLIPIDEMIA TYPE: ICD-10-CM

## 2025-07-28 DIAGNOSIS — Z91.89 MULTIPLE RISK FACTORS FOR CORONARY ARTERY DISEASE: ICD-10-CM

## 2025-07-28 DIAGNOSIS — R00.1 BRADYCARDIA, SINUS: ICD-10-CM

## 2025-07-28 PROCEDURE — 1160F RVW MEDS BY RX/DR IN RCRD: CPT | Performed by: NURSE PRACTITIONER

## 2025-07-28 PROCEDURE — 1159F MED LIST DOCD IN RCRD: CPT | Performed by: NURSE PRACTITIONER

## 2025-07-28 PROCEDURE — 99214 OFFICE O/P EST MOD 30 MIN: CPT | Performed by: NURSE PRACTITIONER

## 2025-07-28 RX ORDER — AMLODIPINE BESYLATE 5 MG/1
5 TABLET ORAL DAILY
Start: 2025-07-28

## 2025-07-28 RX ORDER — ATORVASTATIN CALCIUM 40 MG/1
40 TABLET, FILM COATED ORAL DAILY
Qty: 90 TABLET | Refills: 1 | Status: SHIPPED | OUTPATIENT
Start: 2025-07-28

## 2025-07-28 RX ORDER — ATENOLOL 25 MG/1
25 TABLET ORAL DAILY
Qty: 90 TABLET | Refills: 0 | Status: SHIPPED | OUTPATIENT
Start: 2025-07-28

## 2025-07-28 NOTE — PROGRESS NOTES
Subjective     Germain Perez is a 70 y.o. male.   Chief Complaint   Patient presents with    Hypertension     History of Present Illness   History of Present Illness  Germain Perez is a 70-year-old male who presents to the clinic today for a cardiology follow-up.    Hypertension with recent transitions in medications at the last visit due to elevation. However, due to side effects, he returned to his previous regimen. He remains on losartan 100 mg daily, HCTZ 12.5 mg daily, atenolol 25 mg daily, and amlodipine 5 mg daily. He reports monitoring his blood pressure at home and brings in a log today. Readings were at goal on Bystolic, but he experienced sexual dysfunction side effects and discontinued it. He resumed atenolol, and readings have improved since resuming atenolol. His heart rate remains low normal, and he remains asymptomatic. He reports no chest pain or dyspnea.    For hyperlipidemia, he is on Pravachol 40 mg daily with recent increases in medications; however, LDL remains elevated at 122. He reports previously being on Lipitor, tolerated it well, and is agreeable to return to Lipitor. An echocardiogram after the last visit showed mild concentric hypertrophy and normal LVEF at 61 to 65%. No significant valvular disease or pericardial effusion.    Sinus bradycardia, clinically asymptomatic without complaints of palpitation, dizziness, or syncope.    SOCIAL HISTORY  Exercise: Regular exercise  Diet: Heart healthy diet      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 shoulder    Chronic idiopathic constipation    History of resection of large bowel    Encounter for screening for malignant neoplasm of colon    Hydrocele, bilateral    Bloating    Generalized abdominal pain     Past Medical History:   Diagnosis Date    Anxiety     Arthritis     KNEES    Cancer     MELANOMA, SQUAMOUS AND BASAL CELL    Depression     Elevated cholesterol     GERD  (gastroesophageal reflux disease)     Hiatal hernia     Hyperlipidemia     Hypertension     Knee swelling 2003    Approximate date    ROLANDO (obstructive sleep apnea)     weras c-pap    Subluxation of patella 2022    WPW (Jennifer-Parkinson-White syndrome)      Past Surgical History:   Procedure Laterality Date    APPENDECTOMY      CARDIAC CATHETERIZATION      no stents    COLON SURGERY      COLON RESECTION    COLONOSCOPY      COLONOSCOPY N/A 11/10/2022    Procedure: COLONOSCOPY;  Surgeon: Joan Soto MD;  Location:  [facility];  Service: Gastroenterology;  Laterality: N/A;    ENDOSCOPY      ENDOSCOPY N/A 02/15/2023    Procedure: ESOPHAGOGASTRODUODENOSCOPY WITH BIOPSY;  Surgeon: Joan Soto MD;  Location:  [facility];  Service: Gastroenterology;  Laterality: N/A;    FOOT SURGERY      Dr Miller was my surgeon    HERNIA REPAIR      HYDROCELECTOMY Left 2022    Procedure: HYDROCELECTOMY;  Surgeon: Srikanth Harris MD;  Location:  [facility];  Service: Urology;  Laterality: Left;    INGUINAL HERNIA REPAIR      SENTINEL NODE BIOPSY Left 2018    Procedure: SENTINEL NODE BIOPSY LEFT;  Surgeon: Wili Chow MD;  Location:  [facility];  Service: General    SKIN BIOPSY      13 x    UPPER GASTROINTESTINAL ENDOSCOPY      WIDE EXCISION LESION/MASS TRUNK Left 2018    Procedure: WIDE EXCISION MELANOMA LEFT ARM, LYMPHOSCINTIGRAM;  Surgeon: Wili Chow MD;  Location:  [facility];  Service: General     History reviewed. No pertinent family history.  Social History     Tobacco Use    Smoking status: Former     Current packs/day: 0.00     Average packs/day: 1 pack/day for 29.0 years (29.0 ttl pk-yrs)     Types: Cigarettes     Start date: 1973     Quit date: 2002     Years since quittin.6     Passive exposure: Past    Smokeless tobacco: Never    Tobacco comments:     QUIT 12 YEARS AGO   Vaping Use    Vaping status: Never Used   Substance Use  Topics    Alcohol use: Yes     Alcohol/week: 1.0 standard drink of alcohol     Types: 1 Glasses of wine per week    Drug use: No         The following portions of the patient's history were reviewed and updated as appropriate: allergies, current medications, past family history, past medical history, past social history, past surgical history and problem list.    Allergies   Allergen Reactions    Morphine Nausea And Vomiting         Current Outpatient Medications:     amLODIPine (NORVASC) 5 MG tablet, Take 1 tablet by mouth Daily. for blood pressure, Disp: , Rfl:     aspirin 81 MG chewable tablet, Chew 1 tablet Daily., Disp: , Rfl:     coenzyme Q10 100 MG capsule, Take 2 capsules by mouth Daily., Disp: , Rfl:     escitalopram (LEXAPRO) 10 MG tablet, Take 1 tablet by mouth Daily., Disp: , Rfl:     finasteride (PROSCAR) 5 MG tablet, Take 1 tablet by mouth Daily. as directed, Disp: , Rfl:     hydroCHLOROthiazide 12.5 MG tablet, Take 1 tablet by mouth Daily., Disp: , Rfl:     Hydrocortisone, Perianal, (Anusol-HC) 2.5 % rectal cream, Insert  into the rectum 2 (Two) Times a Day., Disp: 28 g, Rfl: 1    lidocaine (XYLOCAINE) 5 % ointment, Apply 1 application topically to the appropriate area as directed Every 2 (Two) Hours As Needed for Mild Pain., Disp: 30 g, Rfl: 0    linaclotide (LINZESS) 145 MCG capsule capsule, Take 1 capsule by mouth Every Morning Before Breakfast., Disp: 30 capsule, Rfl: 11    losartan (COZAAR) 100 MG tablet, Take 1 tablet by mouth Daily. for blood pressure, Disp: , Rfl:     Multiple Vitamins-Minerals (MULTIVITAMIN AND MINERALS) liquid liquid, Take  by mouth Daily., Disp: , Rfl:     pantoprazole (PROTONIX) 40 MG EC tablet, Take 1 tablet by mouth Daily., Disp: 90 tablet, Rfl: 3    tamsulosin (FLOMAX) 0.4 MG capsule 24 hr capsule, Take 1 capsule by mouth Every Night., Disp: , Rfl:     atenolol (TENORMIN) 25 MG tablet, Take 1 tablet by mouth Daily., Disp: 90 tablet, Rfl: 0    atorvastatin (LIPITOR) 40  "MG tablet, Take 1 tablet by mouth Daily., Disp: 90 tablet, Rfl: 1    Review of Systems   Constitutional:  Negative for activity change, appetite change, chills, diaphoresis, fatigue and fever.   HENT:  Negative for congestion, drooling, ear discharge, ear pain, mouth sores, nosebleeds, postnasal drip, rhinorrhea, sinus pressure, sneezing and sore throat.    Eyes:  Negative for pain, discharge and visual disturbance.   Respiratory:  Negative for cough, chest tightness, shortness of breath and wheezing.    Cardiovascular:  Negative for chest pain, palpitations and leg swelling.   Gastrointestinal:  Negative for abdominal pain, constipation, diarrhea, nausea and vomiting.   Endocrine: Negative for cold intolerance, heat intolerance, polydipsia, polyphagia and polyuria.   Musculoskeletal:  Negative for arthralgias, myalgias and neck pain.   Skin:  Negative for rash and wound.   Neurological:  Negative for dizziness, syncope, speech difficulty, weakness, light-headedness and headaches.   Hematological:  Negative for adenopathy. Does not bruise/bleed easily.   Psychiatric/Behavioral:  Negative for confusion, dysphoric mood and sleep disturbance. The patient is not nervous/anxious.    All other systems reviewed and are negative.    /76 (BP Location: Left arm, Patient Position: Sitting, Cuff Size: Adult)   Pulse 75   Ht 188 cm (74.02\")   Wt 105 kg (231 lb 3.2 oz)   SpO2 92%   BMI 29.67 kg/m²     Objective   Allergies   Allergen Reactions    Morphine Nausea And Vomiting     Physical Exam  Vitals reviewed.   Constitutional:       Appearance: Normal appearance. He is well-developed and overweight.   HENT:      Head: Normocephalic.   Eyes:      Conjunctiva/sclera: Conjunctivae normal.   Neck:      Thyroid: No thyromegaly.      Vascular: No carotid bruit or JVD.   Cardiovascular:      Rate and Rhythm: Normal rate and regular rhythm.   Pulmonary:      Effort: Pulmonary effort is normal.      Breath sounds: Normal " breath sounds.   Musculoskeletal:      Cervical back: Neck supple.      Right lower leg: No edema.      Left lower leg: No edema.   Skin:     General: Skin is warm and dry.   Neurological:      Mental Status: He is alert and oriented to person, place, and time.   Psychiatric:         Attention and Perception: Attention normal.         Mood and Affect: Mood normal.         Speech: Speech normal.         Behavior: Behavior normal. Behavior is cooperative.         Cognition and Memory: Cognition normal.         LABS  WBC   Date Value Ref Range Status   07/09/2025 4.74 3.40 - 10.80 10*3/mm3 Final     RBC   Date Value Ref Range Status   07/09/2025 4.43 4.14 - 5.80 10*6/mm3 Final     Hemoglobin   Date Value Ref Range Status   07/09/2025 14.3 13.0 - 17.7 g/dL Final     Hematocrit   Date Value Ref Range Status   07/09/2025 42.5 37.5 - 51.0 % Final     MCV   Date Value Ref Range Status   07/09/2025 95.9 79.0 - 97.0 fL Final     MCH   Date Value Ref Range Status   07/09/2025 32.3 26.6 - 33.0 pg Final     MCHC   Date Value Ref Range Status   07/09/2025 33.6 31.5 - 35.7 g/dL Final     RDW   Date Value Ref Range Status   07/09/2025 12.7 12.3 - 15.4 % Final     RDW-SD   Date Value Ref Range Status   07/09/2025 45.3 37.0 - 54.0 fl Final     MPV   Date Value Ref Range Status   07/09/2025 10.0 6.0 - 12.0 fL Final     Platelets   Date Value Ref Range Status   07/09/2025 184 140 - 450 10*3/mm3 Final     Neutrophil %   Date Value Ref Range Status   07/09/2025 57.5 42.7 - 76.0 % Final     Lymphocyte %   Date Value Ref Range Status   07/09/2025 33.3 19.6 - 45.3 % Final     Monocyte %   Date Value Ref Range Status   07/09/2025 4.6 (L) 5.0 - 12.0 % Final     Eosinophil %   Date Value Ref Range Status   07/09/2025 3.4 0.3 - 6.2 % Final     Basophil %   Date Value Ref Range Status   07/09/2025 0.6 0.0 - 1.5 % Final     Immature Grans %   Date Value Ref Range Status   07/09/2025 0.6 (H) 0.0 - 0.5 % Final     Neutrophils, Absolute   Date Value  Ref Range Status   07/09/2025 2.72 1.70 - 7.00 10*3/mm3 Final     Lymphocytes, Absolute   Date Value Ref Range Status   07/09/2025 1.58 0.70 - 3.10 10*3/mm3 Final     Monocytes, Absolute   Date Value Ref Range Status   07/09/2025 0.22 0.10 - 0.90 10*3/mm3 Final     Eosinophils, Absolute   Date Value Ref Range Status   07/09/2025 0.16 0.00 - 0.40 10*3/mm3 Final     Basophils, Absolute   Date Value Ref Range Status   07/09/2025 0.03 0.00 - 0.20 10*3/mm3 Final     Immature Grans, Absolute   Date Value Ref Range Status   07/09/2025 0.03 0.00 - 0.05 10*3/mm3 Final     nRBC   Date Value Ref Range Status   07/09/2025 0.0 0.0 - 0.2 /100 WBC Final       Total Cholesterol   Date Value Ref Range Status   07/09/2025 184 0 - 200 mg/dL Final     Triglycerides   Date Value Ref Range Status   07/09/2025 109 0 - 150 mg/dL Final     HDL Cholesterol   Date Value Ref Range Status   07/09/2025 42 40 - 60 mg/dL Final     LDL Cholesterol    Date Value Ref Range Status   07/09/2025 122 (H) 0 - 100 mg/dL Final     IMAGING   XR Knee 3 View Right  Result Date: 5/12/2025  1.  No evidence of an acute fracture or dislocation. 2.  Chondrocalcinosis. 3.  Joint space narrowing medially and joint space narrowing in the patellofemoral space.  This report was finalized on 5/12/2025 1:18 PM by Dr. Luis E Huff MD.      Echocardiogram 6/26/2025  Interpretation Summary       Normal left ventricular cavity size with mild concentric left ventricular hypertrophy noted.    Left ventricular systolic function is normal. Left ventricular ejection fraction appears to be 61 - 65%.    Left ventricular diastolic dysfunction is noted.    Nose significant valvular heart disease detected    No pericardial effusion noted.         Assessment & Plan   Diagnoses and all orders for this visit:    1. Hypertension, unspecified type (Primary)    2. Bradycardia, sinus    3. Hyperlipidemia, unspecified hyperlipidemia type  -     Comprehensive Metabolic Panel; Future  -      Lipid Panel; Future  -     CK; Future    4. Multiple risk factors for coronary artery disease    Other orders  -     amLODIPine (NORVASC) 5 MG tablet; Take 1 tablet by mouth Daily. for blood pressure  -     atorvastatin (LIPITOR) 40 MG tablet; Take 1 tablet by mouth Daily.  Dispense: 90 tablet; Refill: 1  -     atenolol (TENORMIN) 25 MG tablet; Take 1 tablet by mouth Daily.  Dispense: 90 tablet; Refill: 0      Assessment & Plan  1. Hypertension:  - BP is elevated in clinic today however he reports improvements at home and normal readings   - Encouraged to adhere to medication regimen and continue monitoring blood pressure.  - Routine monitoring recommended.  - Sodium restriction recommended.  - Heart-healthy diet and regular exercise recommended.  - If readings exceed 140/90, contact the clinic for potential initiation of hydralazine.    2. Hyperlipidemia:  - LDL levels above target at 122.  - Pravastatin will be discontinued.  - Lipitor 40 mg will be started nightly.  - Recheck labs in 4 to 6 months.  - Heart-healthy diet recommended.  - Aggressive risk factor modification discussed.    3. Sinus bradycardia:  - Clinically asymptomatic.  - Holding parameters on atenolol discussed.  - Continue to monitor condition.    Review of medical record     Aggressive risk factor modification   Clinically asymptomatic, further work up as warranted     Follow up in 6 months, sooner if needed

## 2025-07-28 NOTE — LETTER
August 3, 2025     Haroon Powell MD  [facility] [facility] [address] [address]    Patient: Germain Perez   YOB: 1955   Date of Visit: 7/28/2025       Dear Haroon Powell MD    Germain Perez was in my office today. Below is a copy of my note.    If you have questions, please do not hesitate to call me. I look forward to following Germain along with you.         Sincerely,        BREN Smith        CC: No Recipients    Subjective    Germain Perez is a 70 y.o. male.   Chief Complaint   Patient presents with   • Hypertension     History of Present Illness   History of Present Illness  Germain Perez is a 70-year-old male who presents to the clinic today for a cardiology follow-up.    Hypertension with recent transitions in medications at the last visit due to elevation. However, due to side effects, he returned to his previous regimen. He remains on losartan 100 mg daily, HCTZ 12.5 mg daily, atenolol 25 mg daily, and amlodipine 5 mg daily. He reports monitoring his blood pressure at home and brings in a log today. Readings were at goal on Bystolic, but he experienced sexual dysfunction side effects and discontinued it. He resumed atenolol, and readings have improved since resuming atenolol. His heart rate remains low normal, and he remains asymptomatic. He reports no chest pain or dyspnea.    For hyperlipidemia, he is on Pravachol 40 mg daily with recent increases in medications; however, LDL remains elevated at 122. He reports previously being on Lipitor, tolerated it well, and is agreeable to return to Lipitor. An echocardiogram after the last visit showed mild concentric hypertrophy and normal LVEF at 61 to 65%. No significant valvular disease or pericardial effusion.    Sinus bradycardia, clinically asymptomatic without complaints of palpitation, dizziness, or syncope.    SOCIAL HISTORY  Exercise: Regular exercise  Diet: Heart healthy diet      Patient Active  Problem List   Diagnosis   • Malignant melanoma of left upper extremity including shoulder   • Chronic idiopathic constipation   • History of resection of large bowel   • Encounter for screening for malignant neoplasm of colon   • Hydrocele, bilateral   • Bloating   • Generalized abdominal pain     Past Medical History:   Diagnosis Date   • Anxiety    • Arthritis     KNEES   • Cancer     MELANOMA, SQUAMOUS AND BASAL CELL   • Depression    • Elevated cholesterol    • GERD (gastroesophageal reflux disease)    • Hiatal hernia    • Hyperlipidemia    • Hypertension    • Knee swelling 06/17/2003    Approximate date   • ROLANDO (obstructive sleep apnea)     weras c-pap   • Subluxation of patella 04/07/2022   • WPW (Jennifer-Parkinson-White syndrome)      Past Surgical History:   Procedure Laterality Date   • APPENDECTOMY     • CARDIAC CATHETERIZATION      no stents   • COLON SURGERY      COLON RESECTION   • COLONOSCOPY     • COLONOSCOPY N/A 11/10/2022    Procedure: COLONOSCOPY;  Surgeon: Joan Soto MD;  Location: [facility];  Service: Gastroenterology;  Laterality: N/A;   • ENDOSCOPY     • ENDOSCOPY N/A 02/15/2023    Procedure: ESOPHAGOGASTRODUODENOSCOPY WITH BIOPSY;  Surgeon: Joan Soto MD;  Location: [facility];  Service: Gastroenterology;  Laterality: N/A;   • FOOT SURGERY  2003    Dr Miller was my surgeon   • HERNIA REPAIR     • HYDROCELECTOMY Left 12/07/2022    Procedure: HYDROCELECTOMY;  Surgeon: Srikanth Harris MD;  Location: [facility];  Service: Urology;  Laterality: Left;   • INGUINAL HERNIA REPAIR     • SENTINEL NODE BIOPSY Left 12/17/2018    Procedure: SENTINEL NODE BIOPSY LEFT;  Surgeon: Wili Chow MD;  [facility];  Service: General   • SKIN BIOPSY      13 x   • UPPER GASTROINTESTINAL ENDOSCOPY     • WIDE EXCISION LESION/MASS TRUNK Left 12/17/2018    Procedure: WIDE EXCISION MELANOMA LEFT ARM, LYMPHOSCINTIGRAM;  Surgeon: Wili Chow MD;   Location: [facility] OR;  Service: General     History reviewed. No pertinent family history.  Social History     Tobacco Use   • Smoking status: Former     Current packs/day: 0.00     Average packs/day: 1 pack/day for 29.0 years (29.0 ttl pk-yrs)     Types: Cigarettes     Start date: 1973     Quit date: 2002     Years since quittin.6     Passive exposure: Past   • Smokeless tobacco: Never   • Tobacco comments:     QUIT 12 YEARS AGO   Vaping Use   • Vaping status: Never Used   Substance Use Topics   • Alcohol use: Yes     Alcohol/week: 1.0 standard drink of alcohol     Types: 1 Glasses of wine per week   • Drug use: No         The following portions of the patient's history were reviewed and updated as appropriate: allergies, current medications, past family history, past medical history, past social history, past surgical history and problem list.    Allergies   Allergen Reactions   • Morphine Nausea And Vomiting         Current Outpatient Medications:   •  amLODIPine (NORVASC) 5 MG tablet, Take 1 tablet by mouth Daily. for blood pressure, Disp: , Rfl:   •  aspirin 81 MG chewable tablet, Chew 1 tablet Daily., Disp: , Rfl:   •  coenzyme Q10 100 MG capsule, Take 2 capsules by mouth Daily., Disp: , Rfl:   •  escitalopram (LEXAPRO) 10 MG tablet, Take 1 tablet by mouth Daily., Disp: , Rfl:   •  finasteride (PROSCAR) 5 MG tablet, Take 1 tablet by mouth Daily. as directed, Disp: , Rfl:   •  hydroCHLOROthiazide 12.5 MG tablet, Take 1 tablet by mouth Daily., Disp: , Rfl:   •  Hydrocortisone, Perianal, (Anusol-HC) 2.5 % rectal cream, Insert  into the rectum 2 (Two) Times a Day., Disp: 28 g, Rfl: 1  •  lidocaine (XYLOCAINE) 5 % ointment, Apply 1 application topically to the appropriate area as directed Every 2 (Two) Hours As Needed for Mild Pain., Disp: 30 g, Rfl: 0  •  linaclotide (LINZESS) 145 MCG capsule capsule, Take 1 capsule by mouth Every Morning Before Breakfast., Disp: 30 capsule, Rfl: 11  •  losartan  "(COZAAR) 100 MG tablet, Take 1 tablet by mouth Daily. for blood pressure, Disp: , Rfl:   •  Multiple Vitamins-Minerals (MULTIVITAMIN AND MINERALS) liquid liquid, Take  by mouth Daily., Disp: , Rfl:   •  pantoprazole (PROTONIX) 40 MG EC tablet, Take 1 tablet by mouth Daily., Disp: 90 tablet, Rfl: 3  •  tamsulosin (FLOMAX) 0.4 MG capsule 24 hr capsule, Take 1 capsule by mouth Every Night., Disp: , Rfl:   •  atenolol (TENORMIN) 25 MG tablet, Take 1 tablet by mouth Daily., Disp: 90 tablet, Rfl: 0  •  atorvastatin (LIPITOR) 40 MG tablet, Take 1 tablet by mouth Daily., Disp: 90 tablet, Rfl: 1    Review of Systems   Constitutional:  Negative for activity change, appetite change, chills, diaphoresis, fatigue and fever.   HENT:  Negative for congestion, drooling, ear discharge, ear pain, mouth sores, nosebleeds, postnasal drip, rhinorrhea, sinus pressure, sneezing and sore throat.    Eyes:  Negative for pain, discharge and visual disturbance.   Respiratory:  Negative for cough, chest tightness, shortness of breath and wheezing.    Cardiovascular:  Negative for chest pain, palpitations and leg swelling.   Gastrointestinal:  Negative for abdominal pain, constipation, diarrhea, nausea and vomiting.   Endocrine: Negative for cold intolerance, heat intolerance, polydipsia, polyphagia and polyuria.   Musculoskeletal:  Negative for arthralgias, myalgias and neck pain.   Skin:  Negative for rash and wound.   Neurological:  Negative for dizziness, syncope, speech difficulty, weakness, light-headedness and headaches.   Hematological:  Negative for adenopathy. Does not bruise/bleed easily.   Psychiatric/Behavioral:  Negative for confusion, dysphoric mood and sleep disturbance. The patient is not nervous/anxious.    All other systems reviewed and are negative.    /76 (BP Location: Left arm, Patient Position: Sitting, Cuff Size: Adult)   Pulse 75   Ht 188 cm (74.02\")   Wt 105 kg (231 lb 3.2 oz)   SpO2 92%   BMI 29.67 kg/m² "     Objective  Allergies   Allergen Reactions   • Morphine Nausea And Vomiting     Physical Exam  Vitals reviewed.   Constitutional:       Appearance: Normal appearance. He is well-developed and overweight.   HENT:      Head: Normocephalic.   Eyes:      Conjunctiva/sclera: Conjunctivae normal.   Neck:      Thyroid: No thyromegaly.      Vascular: No carotid bruit or JVD.   Cardiovascular:      Rate and Rhythm: Normal rate and regular rhythm.   Pulmonary:      Effort: Pulmonary effort is normal.      Breath sounds: Normal breath sounds.   Musculoskeletal:      Cervical back: Neck supple.      Right lower leg: No edema.      Left lower leg: No edema.   Skin:     General: Skin is warm and dry.   Neurological:      Mental Status: He is alert and oriented to person, place, and time.   Psychiatric:         Attention and Perception: Attention normal.         Mood and Affect: Mood normal.         Speech: Speech normal.         Behavior: Behavior normal. Behavior is cooperative.         Cognition and Memory: Cognition normal.         LABS  WBC   Date Value Ref Range Status   07/09/2025 4.74 3.40 - 10.80 10*3/mm3 Final     RBC   Date Value Ref Range Status   07/09/2025 4.43 4.14 - 5.80 10*6/mm3 Final     Hemoglobin   Date Value Ref Range Status   07/09/2025 14.3 13.0 - 17.7 g/dL Final     Hematocrit   Date Value Ref Range Status   07/09/2025 42.5 37.5 - 51.0 % Final     MCV   Date Value Ref Range Status   07/09/2025 95.9 79.0 - 97.0 fL Final     MCH   Date Value Ref Range Status   07/09/2025 32.3 26.6 - 33.0 pg Final     MCHC   Date Value Ref Range Status   07/09/2025 33.6 31.5 - 35.7 g/dL Final     RDW   Date Value Ref Range Status   07/09/2025 12.7 12.3 - 15.4 % Final     RDW-SD   Date Value Ref Range Status   07/09/2025 45.3 37.0 - 54.0 fl Final     MPV   Date Value Ref Range Status   07/09/2025 10.0 6.0 - 12.0 fL Final     Platelets   Date Value Ref Range Status   07/09/2025 184 140 - 450 10*3/mm3 Final     Neutrophil %    Date Value Ref Range Status   07/09/2025 57.5 42.7 - 76.0 % Final     Lymphocyte %   Date Value Ref Range Status   07/09/2025 33.3 19.6 - 45.3 % Final     Monocyte %   Date Value Ref Range Status   07/09/2025 4.6 (L) 5.0 - 12.0 % Final     Eosinophil %   Date Value Ref Range Status   07/09/2025 3.4 0.3 - 6.2 % Final     Basophil %   Date Value Ref Range Status   07/09/2025 0.6 0.0 - 1.5 % Final     Immature Grans %   Date Value Ref Range Status   07/09/2025 0.6 (H) 0.0 - 0.5 % Final     Neutrophils, Absolute   Date Value Ref Range Status   07/09/2025 2.72 1.70 - 7.00 10*3/mm3 Final     Lymphocytes, Absolute   Date Value Ref Range Status   07/09/2025 1.58 0.70 - 3.10 10*3/mm3 Final     Monocytes, Absolute   Date Value Ref Range Status   07/09/2025 0.22 0.10 - 0.90 10*3/mm3 Final     Eosinophils, Absolute   Date Value Ref Range Status   07/09/2025 0.16 0.00 - 0.40 10*3/mm3 Final     Basophils, Absolute   Date Value Ref Range Status   07/09/2025 0.03 0.00 - 0.20 10*3/mm3 Final     Immature Grans, Absolute   Date Value Ref Range Status   07/09/2025 0.03 0.00 - 0.05 10*3/mm3 Final     nRBC   Date Value Ref Range Status   07/09/2025 0.0 0.0 - 0.2 /100 WBC Final       Total Cholesterol   Date Value Ref Range Status   07/09/2025 184 0 - 200 mg/dL Final     Triglycerides   Date Value Ref Range Status   07/09/2025 109 0 - 150 mg/dL Final     HDL Cholesterol   Date Value Ref Range Status   07/09/2025 42 40 - 60 mg/dL Final     LDL Cholesterol    Date Value Ref Range Status   07/09/2025 122 (H) 0 - 100 mg/dL Final     IMAGING   XR Knee 3 View Right  Result Date: 5/12/2025  1.  No evidence of an acute fracture or dislocation. 2.  Chondrocalcinosis. 3.  Joint space narrowing medially and joint space narrowing in the patellofemoral space.  This report was finalized on 5/12/2025 1:18 PM by Dr. Luis E Huff MD.      Echocardiogram 6/26/2025  Interpretation Summary     •  Normal left ventricular cavity size with mild concentric  left ventricular hypertrophy noted.  •  Left ventricular systolic function is normal. Left ventricular ejection fraction appears to be 61 - 65%.  •  Left ventricular diastolic dysfunction is noted.  •  Nose significant valvular heart disease detected  •  No pericardial effusion noted.         Assessment & Plan  Diagnoses and all orders for this visit:    1. Hypertension, unspecified type (Primary)    2. Bradycardia, sinus    3. Hyperlipidemia, unspecified hyperlipidemia type  -     Comprehensive Metabolic Panel; Future  -     Lipid Panel; Future  -     CK; Future    4. Multiple risk factors for coronary artery disease    Other orders  -     amLODIPine (NORVASC) 5 MG tablet; Take 1 tablet by mouth Daily. for blood pressure  -     atorvastatin (LIPITOR) 40 MG tablet; Take 1 tablet by mouth Daily.  Dispense: 90 tablet; Refill: 1  -     atenolol (TENORMIN) 25 MG tablet; Take 1 tablet by mouth Daily.  Dispense: 90 tablet; Refill: 0      Assessment & Plan  1. Hypertension:  - BP is elevated in clinic today however he reports improvements at home and normal readings   - Encouraged to adhere to medication regimen and continue monitoring blood pressure.  - Routine monitoring recommended.  - Sodium restriction recommended.  - Heart-healthy diet and regular exercise recommended.  - If readings exceed 140/90, contact the clinic for potential initiation of hydralazine.    2. Hyperlipidemia:  - LDL levels above target at 122.  - Pravastatin will be discontinued.  - Lipitor 40 mg will be started nightly.  - Recheck labs in 4 to 6 months.  - Heart-healthy diet recommended.  - Aggressive risk factor modification discussed.    3. Sinus bradycardia:  - Clinically asymptomatic.  - Holding parameters on atenolol discussed.  - Continue to monitor condition.    Review of medical record     Aggressive risk factor modification   Clinically asymptomatic, further work up as warranted     Follow up in 6 months, sooner if needed

## 2025-08-03 RX ORDER — FLUTICASONE PROPIONATE 50 MCG
2 SPRAY, SUSPENSION (ML) NASAL DAILY
Qty: 16 G | Refills: 0 | Status: SHIPPED | OUTPATIENT
Start: 2025-08-03

## (undated) DEVICE — DISH PETRI 3.5IN MD STRL LF

## (undated) DEVICE — APPL CHLORAPREP HI/LITE 26ML ORNG

## (undated) DEVICE — ANTIBACTERIAL UNDYED BRAIDED (POLYGLACTIN 910), SYNTHETIC ABSORBABLE SUTURE: Brand: COATED VICRYL

## (undated) DEVICE — GOWN IMPERV 2XL BLU CA/50

## (undated) DEVICE — POLYP TRAP: Brand: TRAPEASE®

## (undated) DEVICE — ENDOGATOR TUBING FOR ENDOGATOR EGP-100 IRRIGATION PUMP,OLYMPUS OFP PUMP, OLYMPUS AFU-100 PUMP AND ERBE EIP2 PUMP: Brand: ENDOGATOR

## (undated) DEVICE — PK MINOR SPLT 10

## (undated) DEVICE — 3M™ STERI-STRIP™ REINFORCED ADHESIVE SKIN CLOSURES, R1547, 1/2 IN X 4 IN (12 MM X 100 MM), 6 STRIPS/ENVELOPE: Brand: 3M™ STERI-STRIP™

## (undated) DEVICE — CAMERA/LASER ARM DRAPE: Brand: DEROYAL

## (undated) DEVICE — GOWN,PREVENTION PLUS,XXLARGE,STERILE: Brand: MEDLINE

## (undated) DEVICE — SUT GUT CHRM 3/0 SH 27IN G122H

## (undated) DEVICE — Device: Brand: DEFENDO AIR/WATER/SUCTION AND BIOPSY VALVE

## (undated) DEVICE — SKIN AFFIX SURG ADHESIVE 72/CS 0.55ML: Brand: MEDLINE

## (undated) DEVICE — PK BASIC 70

## (undated) DEVICE — ENCORE® LATEX MICRO SIZE 8, STERILE LATEX POWDER-FREE SURGICAL GLOVE: Brand: ENCORE

## (undated) DEVICE — COVER,LIGHT HANDLE,FLX,1/PK: Brand: MEDLINE INDUSTRIES, INC.

## (undated) DEVICE — DRAPE,LAPAROTOMY,PED,STERILE: Brand: MEDLINE

## (undated) DEVICE — CONN Y IRR DISP 1P/U

## (undated) DEVICE — ADHS SKIN PREMIERPRO EXOFIN TOPICAL HI/VISC .5ML

## (undated) DEVICE — ENDOGATOR AUXILIARY WATER JET CONNECTOR: Brand: ENDOGATOR

## (undated) DEVICE — SINGLE PORT MANIFOLD: Brand: NEPTUNE 2

## (undated) DEVICE — SUT SILK 3/0 TIES 18IN A184H

## (undated) DEVICE — PATIENT RETURN ELECTRODE, SINGLE-USE, CONTACT QUALITY MONITORING, ADULT, WITH 9FT CORD, FOR PATIENTS WEIGING OVER 33LBS. (15KG): Brand: MEGADYNE

## (undated) DEVICE — Device

## (undated) DEVICE — SUT SILK 2/0 PS 18IN 1588H

## (undated) DEVICE — SNAR POLYP CAPTIFLX MICRO OVL 13MM 240CM

## (undated) DEVICE — THE BITE BLOCK MAXI, LATEX FREE STRAP IS USED TO PROTECT THE ENDOSCOPE INSERTION TUBE FROM BEING BITTEN BY THE PATIENT.

## (undated) DEVICE — APPL CHLORAPREP W/ALC 26ML CLR

## (undated) DEVICE — FRCP BX RADJAW4 NDL 2.8 240CM LG OG BX40

## (undated) DEVICE — NDL HYPO ECLPS SFTY 18G 1 1/2IN

## (undated) DEVICE — TBG PENCL TELESCP MEGADYNE SMOKE EVAC 10FT

## (undated) DEVICE — SUT SILK 2/0 SH 30IN K833H

## (undated) DEVICE — GLV SURG BIOGEL LTX PF 7 1/2

## (undated) DEVICE — GLV SURG PREMIERPRO MIC LTX PF SZ8 BRN

## (undated) DEVICE — DRSNG SURESITE WNDW 4X4.5

## (undated) DEVICE — STCKNT IMPERV 12IN STRL

## (undated) DEVICE — SUT SILK 2/0 TIES 18IN A185H

## (undated) DEVICE — SUT MNCRYL PLS ANTIB UD 4/0 PS2 18IN

## (undated) DEVICE — SYR LL TP 10ML STRL